# Patient Record
Sex: FEMALE | Race: WHITE | Employment: UNEMPLOYED | URBAN - METROPOLITAN AREA
[De-identification: names, ages, dates, MRNs, and addresses within clinical notes are randomized per-mention and may not be internally consistent; named-entity substitution may affect disease eponyms.]

---

## 2017-01-04 ENCOUNTER — TELEPHONE (OUTPATIENT)
Dept: OBGYN CLINIC | Facility: CLINIC | Age: 42
End: 2017-01-04

## 2017-01-05 NOTE — TELEPHONE ENCOUNTER
Patient informed and verbalized understanding of normal result    Faxed to pt's fax # as requested [phone]

## 2017-01-25 ENCOUNTER — HOSPITAL ENCOUNTER (OUTPATIENT)
Dept: MAMMOGRAPHY | Facility: HOSPITAL | Age: 42
Discharge: HOME | End: 2017-01-25
Attending: OBSTETRICS & GYNECOLOGY
Payer: COMMERCIAL

## 2017-01-25 PROCEDURE — 77067 SCR MAMMO BI INCL CAD: CPT

## 2017-01-27 ENCOUNTER — TELEPHONE (OUTPATIENT)
Dept: OBGYN CLINIC | Facility: CLINIC | Age: 42
End: 2017-01-27

## 2017-01-27 NOTE — TELEPHONE ENCOUNTER
Pt informed of Dr. Amberly Moya notation that her mammogram results are incomplete and that radiology just needs additional views. Pt verbalizes understanding.

## 2017-01-27 NOTE — TELEPHONE ENCOUNTER
Per pt she just got a call form the mammo dep, that they found \"something\" pt is at work now, very concern she has to go Monday again for a mammo and u/s, pt is very concern because her mom was just dx with breast cancer for the 2nd time

## 2017-01-27 NOTE — TELEPHONE ENCOUNTER
The mammogram results are read as Incomplete. Radiologist is not saying anything is wrong. They just need additional views.

## 2017-01-30 ENCOUNTER — HOSPITAL ENCOUNTER (OUTPATIENT)
Dept: MAMMOGRAPHY | Facility: HOSPITAL | Age: 42
Discharge: HOME | End: 2017-01-30
Attending: OBSTETRICS & GYNECOLOGY
Payer: COMMERCIAL

## 2017-01-30 DIAGNOSIS — R92.8 ABNORMAL MAMMOGRAM: ICD-10-CM

## 2017-01-30 PROCEDURE — 77065 DX MAMMO INCL CAD UNI: CPT | Performed by: RADIOLOGY

## 2017-01-31 ENCOUNTER — TELEPHONE (OUTPATIENT)
Dept: OBGYN CLINIC | Facility: CLINIC | Age: 42
End: 2017-01-31

## 2017-02-01 ENCOUNTER — OFFICE VISIT (OUTPATIENT)
Dept: OBGYN CLINIC | Facility: CLINIC | Age: 42
End: 2017-02-01

## 2017-02-01 VITALS — SYSTOLIC BLOOD PRESSURE: 121 MMHG | DIASTOLIC BLOOD PRESSURE: 78 MMHG

## 2017-02-01 DIAGNOSIS — Z12.31 VISIT FOR SCREENING MAMMOGRAM: Primary | ICD-10-CM

## 2017-02-01 PROCEDURE — 99213 OFFICE O/P EST LOW 20 MIN: CPT | Performed by: OBSTETRICS & GYNECOLOGY

## 2017-02-01 NOTE — PROGRESS NOTES
HPI:    Patient ID: Isiah Bustos is a 37year old female. HPI  Patient here for F/U after additional views on Mammogram.  All is read as normal.  Patient's mother recently diagnosed with recurrence of Breast Ca. She also tested + for Brca.   Patient h encounter.        Meds This Visit:  No prescriptions requested or ordered in this encounter    Imaging & Referrals:  None       #[id number]

## 2017-02-08 ENCOUNTER — APPOINTMENT (OUTPATIENT)
Dept: GENETICS | Facility: HOSPITAL | Age: 42
End: 2017-02-08
Attending: GENETIC COUNSELOR, MS
Payer: COMMERCIAL

## 2017-02-22 ENCOUNTER — APPOINTMENT (OUTPATIENT)
Dept: GENETICS | Facility: HOSPITAL | Age: 42
End: 2017-02-22
Attending: GENETIC COUNSELOR, MS
Payer: COMMERCIAL

## 2017-03-02 ENCOUNTER — OFFICE VISIT (OUTPATIENT)
Dept: GENETICS | Facility: HOSPITAL | Age: 42
End: 2017-03-02
Attending: GENETIC COUNSELOR, MS
Payer: COMMERCIAL

## 2017-03-02 DIAGNOSIS — Z80.3 FAMILY HISTORY OF MALIGNANT NEOPLASM OF BREAST: Primary | ICD-10-CM

## 2017-03-02 DIAGNOSIS — Z84.81 FHX: BRCA1 GENE POSITIVE: ICD-10-CM

## 2017-03-02 DIAGNOSIS — Z80.41 FAMILY HISTORY OF MALIGNANT NEOPLASM OF OVARY: ICD-10-CM

## 2017-03-02 PROCEDURE — 96040 MEDICAL GENETICS COUNSELING EA 30 MIN: CPT | Performed by: GENETIC COUNSELOR, MS

## 2017-03-02 NOTE — PROGRESS NOTES
Reason for visit: Ms. Jolaine Claude is a 46-year-old woman whose mother was found to be positive for a BRCA1 mutation. Other family members are considering the option of genetic testing for this familial mutation.  She is now interested in pursuing genetic testing replacement therapy but does have a history of two years of oral contraceptive usage. She denies any alcohol or tobacco usage. She has not yet had a colonoscopy. She has no history of abnormal mammograms and is up to date on her breast imaging.   She maurizio discussed options including preimplantation genetic diagnosis (PGD), which she does not believe she would be something she would be interested in. The pros and cons of cancer predisposing gene mutation testing were reviewed with Ms. Moi Lyle.   Genetic test familial mutation would yield either a positive (mutation detected) or negative (no mutation detected) result and gives less expensive, faster results.  Given her family history, testing aside from the known familial mutation would not be medically indicate

## 2017-03-09 ENCOUNTER — TELEPHONE (OUTPATIENT)
Dept: GENETICS | Facility: HOSPITAL | Age: 42
End: 2017-03-09

## 2017-03-09 NOTE — TELEPHONE ENCOUNTER
Reported POSITIVE BRCA1 mutation results to Alexus Blakely over phone. She had been in results session with her mother, so she declined meeting in person for a f/u visit.  She was tested by [facility] for the known familial mutation only (BRCA1, [id number]

## 2017-03-09 NOTE — TELEPHONE ENCOUNTER
Spoke with patient about + BRCA1. Patient feeling better about results and is over the shock. Encouraged to call for appointment with Dr Zander Jenkins. Expresses understanding and agrees.

## 2017-03-22 ENCOUNTER — TELEPHONE (OUTPATIENT)
Dept: OBGYN CLINIC | Facility: CLINIC | Age: 42
End: 2017-03-22

## 2017-03-22 NOTE — TELEPHONE ENCOUNTER
Pt voices LMP 1/31/17. Pt did voices she had light, dark brown spotting on march 13th, 17th, and 19th. She also voices she has been feeling dizzy, nauseated and is having sensitivity with smells the past couple of weeks.  Pt has not taken a pregnancy test.

## 2017-03-24 ENCOUNTER — OFFICE VISIT (OUTPATIENT)
Dept: SURGERY | Facility: CLINIC | Age: 42
End: 2017-03-24

## 2017-03-24 VITALS
DIASTOLIC BLOOD PRESSURE: 76 MMHG | SYSTOLIC BLOOD PRESSURE: 114 MMHG | HEIGHT: 61 IN | HEART RATE: 62 BPM | WEIGHT: 123.38 LBS | RESPIRATION RATE: 18 BRPM | TEMPERATURE: 98 F | BODY MASS INDEX: 23.29 KG/M2

## 2017-03-24 DIAGNOSIS — Z15.01 BRCA1 GENETIC CARRIER: Primary | ICD-10-CM

## 2017-03-24 DIAGNOSIS — Z15.09 BRCA1 GENETIC CARRIER: Primary | ICD-10-CM

## 2017-03-24 DIAGNOSIS — R92.2 DENSE BREASTS: ICD-10-CM

## 2017-03-24 DIAGNOSIS — Z12.39 BREAST CANCER SCREENING, HIGH RISK PATIENT: ICD-10-CM

## 2017-03-24 PROCEDURE — 99244 OFF/OP CNSLTJ NEW/EST MOD 40: CPT | Performed by: SURGERY

## 2017-03-24 NOTE — PROGRESS NOTES
Patient presents with:  High Risk For Breast Cancer: Referred by Dr. Shannon Bacon, BRCA 1 +    Verified and updated allergy and medication list.     LMP: 01/31/17    Age of menarche: 9  Number of pregnancies:3  Age at 4st full term delivery: 40    Education Re

## 2017-04-03 ENCOUNTER — OFFICE VISIT (OUTPATIENT)
Dept: OBGYN CLINIC | Facility: CLINIC | Age: 42
End: 2017-04-03

## 2017-04-03 VITALS
SYSTOLIC BLOOD PRESSURE: 115 MMHG | BODY MASS INDEX: 23 KG/M2 | DIASTOLIC BLOOD PRESSURE: 74 MMHG | WEIGHT: 124 LBS | HEART RATE: 55 BPM

## 2017-04-03 DIAGNOSIS — N92.6 MISSED MENSES: Primary | ICD-10-CM

## 2017-04-03 PROBLEM — O09.299 H/O GESTATIONAL DIABETES IN PRIOR PREGNANCY, CURRENTLY PREGNANT: Status: ACTIVE | Noted: 2017-04-03

## 2017-04-03 PROBLEM — O09.529 AMA (ADVANCED MATERNAL AGE) MULTIGRAVIDA 35+: Status: ACTIVE | Noted: 2017-04-03

## 2017-04-03 PROBLEM — Z86.32 H/O GESTATIONAL DIABETES IN PRIOR PREGNANCY, CURRENTLY PREGNANT: Status: ACTIVE | Noted: 2017-04-03

## 2017-04-03 PROBLEM — O09.299 H/O PRE-ECLAMPSIA IN PRIOR PREGNANCY, CURRENTLY PREGNANT: Status: ACTIVE | Noted: 2017-04-03

## 2017-04-03 PROBLEM — Z98.891 H/O CESAREAN SECTION: Status: ACTIVE | Noted: 2017-04-03

## 2017-04-03 PROBLEM — O09.529 AMA (ADVANCED MATERNAL AGE) MULTIGRAVIDA 35+ (HCC): Status: ACTIVE | Noted: 2017-04-03

## 2017-04-03 PROBLEM — O09.299 H/O GESTATIONAL DIABETES IN PRIOR PREGNANCY, CURRENTLY PREGNANT (HCC): Status: ACTIVE | Noted: 2017-04-03

## 2017-04-03 PROBLEM — Z98.890 H/O LEEP: Status: ACTIVE | Noted: 2017-04-03

## 2017-04-03 PROBLEM — O09.299 H/O PRE-ECLAMPSIA IN PRIOR PREGNANCY, CURRENTLY PREGNANT (HCC): Status: ACTIVE | Noted: 2017-04-03

## 2017-04-03 PROBLEM — Z86.32 H/O GESTATIONAL DIABETES IN PRIOR PREGNANCY, CURRENTLY PREGNANT (HCC): Status: ACTIVE | Noted: 2017-04-03

## 2017-04-03 PROCEDURE — 99213 OFFICE O/P EST LOW 20 MIN: CPT | Performed by: OBSTETRICS & GYNECOLOGY

## 2017-04-03 PROCEDURE — 81025 URINE PREGNANCY TEST: CPT | Performed by: OBSTETRICS & GYNECOLOGY

## 2017-04-03 NOTE — PROGRESS NOTES
HPI:    Patient ID: Ly Wang is a 40year old female. HPI  Patient here for PCV. Discussed PNC and PNV. Already taking PNV. Discussed optional and required screening. Diet and exercise reviewed. S/P Gastric Bypass surgery. Has lost 90 lbs.

## 2017-04-10 ENCOUNTER — NURSE ONLY (OUTPATIENT)
Dept: OBGYN CLINIC | Facility: CLINIC | Age: 42
End: 2017-04-10

## 2017-04-10 DIAGNOSIS — Z34.81 OTHER NORMAL PREGNANCY, NOT FIRST, FIRST TRIMESTER: Primary | ICD-10-CM

## 2017-04-10 NOTE — PROGRESS NOTES
Pt here today for [facility]   Education visit, Educational material reviewed. Pt verbalized understanding. Rx given for pn labs. Consents to blood transfusion.

## 2017-05-10 ENCOUNTER — INITIAL PRENATAL (OUTPATIENT)
Dept: OBGYN CLINIC | Facility: CLINIC | Age: 42
End: 2017-05-10

## 2017-05-10 VITALS — WEIGHT: 131 LBS | BODY MASS INDEX: 25 KG/M2 | SYSTOLIC BLOOD PRESSURE: 104 MMHG | DIASTOLIC BLOOD PRESSURE: 60 MMHG

## 2017-05-10 DIAGNOSIS — Z34.91 NORMAL PREGNANCY, FIRST TRIMESTER: Primary | ICD-10-CM

## 2017-05-10 PROBLEM — Z98.84 H/O GASTRIC BYPASS: Status: ACTIVE | Noted: 2017-05-10

## 2017-05-10 PROCEDURE — 81002 URINALYSIS NONAUTO W/O SCOPE: CPT | Performed by: OBSTETRICS & GYNECOLOGY

## 2017-05-10 NOTE — PROGRESS NOTES
GC/Chlamydia Culture Done. Patient to see MFM for AMA and H/O LEEP. Unsure about Genetic testing will discuss with MFM. Will need Level 2 U/S. Will do labs this weekend. Desires Repeat  @ 39 weeks.

## 2017-05-11 ENCOUNTER — OFFICE VISIT (OUTPATIENT)
Dept: INTERNAL MEDICINE CLINIC | Facility: CLINIC | Age: 42
End: 2017-05-11

## 2017-05-11 ENCOUNTER — TELEPHONE (OUTPATIENT)
Dept: INTERNAL MEDICINE CLINIC | Facility: CLINIC | Age: 42
End: 2017-05-11

## 2017-05-11 VITALS
DIASTOLIC BLOOD PRESSURE: 80 MMHG | BODY MASS INDEX: 24.99 KG/M2 | WEIGHT: 132.38 LBS | HEIGHT: 61 IN | TEMPERATURE: 98 F | OXYGEN SATURATION: 98 % | SYSTOLIC BLOOD PRESSURE: 110 MMHG | HEART RATE: 61 BPM

## 2017-05-11 DIAGNOSIS — L70.0 ACNE VULGARIS: ICD-10-CM

## 2017-05-11 DIAGNOSIS — Z98.84 H/O GASTRIC BYPASS: ICD-10-CM

## 2017-05-11 DIAGNOSIS — Z00.00 PHYSICAL EXAM: Primary | ICD-10-CM

## 2017-05-11 DIAGNOSIS — O09.291 H/O PRE-ECLAMPSIA IN PRIOR PREGNANCY, CURRENTLY PREGNANT, FIRST TRIMESTER: ICD-10-CM

## 2017-05-11 DIAGNOSIS — Z98.891 H/O CESAREAN SECTION: ICD-10-CM

## 2017-05-11 DIAGNOSIS — L63.9 ALOPECIA AREATA: ICD-10-CM

## 2017-05-11 DIAGNOSIS — Z80.3 FAMILY HISTORY OF MALIGNANT NEOPLASM OF BREAST: ICD-10-CM

## 2017-05-11 DIAGNOSIS — Z80.41 FAMILY HISTORY OF MALIGNANT NEOPLASM OF OVARY: ICD-10-CM

## 2017-05-11 DIAGNOSIS — Z84.81 FHX: BRCA1 GENE POSITIVE: ICD-10-CM

## 2017-05-11 DIAGNOSIS — Z86.32 H/O GESTATIONAL DIABETES IN PRIOR PREGNANCY, CURRENTLY PREGNANT, FIRST TRIMESTER: ICD-10-CM

## 2017-05-11 DIAGNOSIS — O09.522 AMA (ADVANCED MATERNAL AGE) MULTIGRAVIDA 35+, SECOND TRIMESTER: ICD-10-CM

## 2017-05-11 DIAGNOSIS — Z98.890 H/O LEEP: ICD-10-CM

## 2017-05-11 DIAGNOSIS — O09.291 H/O GESTATIONAL DIABETES IN PRIOR PREGNANCY, CURRENTLY PREGNANT, FIRST TRIMESTER: ICD-10-CM

## 2017-05-11 PROBLEM — O09.529 AMA (ADVANCED MATERNAL AGE) MULTIGRAVIDA 35+: Status: RESOLVED | Noted: 2017-04-03 | Resolved: 2017-05-11

## 2017-05-11 PROBLEM — O09.529 AMA (ADVANCED MATERNAL AGE) MULTIGRAVIDA 35+ (HCC): Status: RESOLVED | Noted: 2017-04-03 | Resolved: 2017-05-11

## 2017-05-11 PROCEDURE — 99386 PREV VISIT NEW AGE 40-64: CPT | Performed by: INTERNAL MEDICINE

## 2017-05-11 NOTE — TELEPHONE ENCOUNTER
Issa Hyde, this patient sees me in the office after Dr. Holli Choudhury has retired, has about 4 patches of clearing on her scalp that are very suspicious for alopecia areata, are you one and that will procedure these to improve them? she has an appointment coming u

## 2017-05-11 NOTE — PATIENT INSTRUCTIONS
1. Physical exam  Physical exam instruction: Improve diet and exercise, call with questions. 2. AMA (advanced maternal age) multigravida 33+, second trimester  Get thru it and call with questions    3. Acne vulgaris  Cont with derm    4.  Family history of

## 2017-05-11 NOTE — TELEPHONE ENCOUNTER
Dr. Zulema Maxwell, yes we treat patients with Alopecia Areata. Of course, everything is more challenging with pregnancy. Her appointment is 5/17, There might be cancellations before that, the staff can offer her these.  Thanks, Alejandro Vasquez

## 2017-05-11 NOTE — PROGRESS NOTES
HPI:   Paul Grider is a 40year old female who presents for a complete physical exam.     Patient complains of: Patient presents with:  Establish Care: Prior PCP Dr. Stephanie Willett, sees Dr. Wil Lee, sees Dr. Raz Willis for +BRCA gene.  Pt will be having Comment boil surgery      2012    GASTRIC BYPASS,OBESE<100CM KRISH-EN-Y      HEMORRHOIDECTOMY        Family History   Problem Relation Age of Onset   • Cancer Other    • Diabetes Other      paternal   • Arthritis Other    • Heart Disease Ot allergies and food allergies. EXAM:   /80 mmHg  Pulse 61  Temp(Src) 98.4 °F (36.9 °C)  Ht 5' 1\" (1.549 m)  Wt 132 lb 6.4 oz (60.056 kg)  BMI 25.03 kg/m2  SpO2 98%  LMP 01/31/2017  Body mass index is 25.03 kg/(m^2).    PHYSICAL EXAMINATION:     Gen f/u with gyne    11. H/O pre-eclampsia in prior pregnancy, currently pregnant, first trimester  Stable, f/u with gyne    15.  Alopecia areata  See dr Veronica Villalobos did sent her a message and will let you know      Judit Mendez, DO  5/11/2017  5:48 PM

## 2017-05-17 ENCOUNTER — OFFICE VISIT (OUTPATIENT)
Dept: DERMATOLOGY CLINIC | Facility: CLINIC | Age: 42
End: 2017-05-17

## 2017-05-17 DIAGNOSIS — L63.9 ALOPECIA AREATA: Primary | ICD-10-CM

## 2017-05-17 PROCEDURE — 99202 OFFICE O/P NEW SF 15 MIN: CPT | Performed by: DERMATOLOGY

## 2017-05-17 PROCEDURE — 99212 OFFICE O/P EST SF 10 MIN: CPT | Performed by: DERMATOLOGY

## 2017-05-17 RX ORDER — CLOBETASOL PROPIONATE 0.46 MG/ML
1 SOLUTION TOPICAL 2 TIMES DAILY
Qty: 50 ML | Refills: 6 | Status: SHIPPED | OUTPATIENT
Start: 2017-05-17 | End: 2017-08-09

## 2017-05-24 ENCOUNTER — HOSPITAL ENCOUNTER (OUTPATIENT)
Dept: PERINATAL CARE | Facility: HOSPITAL | Age: 42
Discharge: HOME | End: 2017-05-24
Attending: OBSTETRICS & GYNECOLOGY
Payer: COMMERCIAL

## 2017-05-24 ENCOUNTER — TELEPHONE (OUTPATIENT)
Dept: OBGYN CLINIC | Facility: CLINIC | Age: 42
End: 2017-05-24

## 2017-05-24 VITALS — DIASTOLIC BLOOD PRESSURE: 79 MMHG | SYSTOLIC BLOOD PRESSURE: 114 MMHG | HEART RATE: 70 BPM

## 2017-05-24 DIAGNOSIS — O09.522 AMA (ADVANCED MATERNAL AGE) MULTIGRAVIDA 35+, SECOND TRIMESTER: ICD-10-CM

## 2017-05-24 DIAGNOSIS — Z98.890 H/O LEEP: ICD-10-CM

## 2017-05-24 DIAGNOSIS — Z98.890 H/O LEEP: Primary | ICD-10-CM

## 2017-05-24 PROCEDURE — 76817 TRANSVAGINAL US OBSTETRIC: CPT | Performed by: OBSTETRICS & GYNECOLOGY

## 2017-05-24 PROCEDURE — 76815 OB US LIMITED FETUS(S): CPT | Performed by: OBSTETRICS & GYNECOLOGY

## 2017-05-24 PROCEDURE — 99243 OFF/OP CNSLTJ NEW/EST LOW 30: CPT | Performed by: OBSTETRICS & GYNECOLOGY

## 2017-05-24 NOTE — PROGRESS NOTES
Ned Silva is a 40year old female. Reason for Consult:   AMA. H/o LEEP. H/o gastric bypass. Previous   Doing well. No complaints.   Review of History:     OB History:    Obstetric History     T1    TAB1  SAB0  E0  M0  L1 Cancer Other    • Diabetes Other      paternal   • Arthritis Other    • Heart Disease Other      family h/o   • High Cholesterol Father    • Breast Cancer Mother 48     also 58; BRCA1 +   • Diabetes Mother    • Hypertension Mother    • Ovarian Cancer Mater were not associated with a significant increase in PPROM frequency. The risk of  delivery following LEEP is controversial because some studies did not find an increased  risk of  mortality and  delivery.   Greater depth of exci the increased number of women aged 28 to 39, but also to later marriage, second marriage, the availability of better contraceptive options, and wider opportunities for further education and career advancement.     Studies have generally shown good pregnancy two most common medical problems complicating pregnancy are hypertension and diabetes.    The incidence of preeclampsia in the general obstetric population is 3 to 4 percent; this increases to 5 to 10 percent in women over age 36 and is as high as 28 percen cervical length is  3.1 cm without funneling. See imaging tab for complete ultrasound report or in PACS  IMPRESSION:   IUP at 16w1d  AMA- she declined all genetic testing.   H/o LEEP  H/o   H/o gastric bypass  H/o preeclampsia    RECOMMENDATIONS:

## 2017-05-24 NOTE — TELEPHONE ENCOUNTER
Clobetasol Propionate 0.05 % External Solution 50 mL 6 5/17/2017 5/17/2018      Sig :  Apply 1 mL topically 2 (two) times daily.       Route:   Topical       Order #:   [id number]               Pt was prescribed rxby derm for alopecia, per derm okay in preg

## 2017-05-26 NOTE — TELEPHONE ENCOUNTER
Clobetasol is a Category C medication. The patient has completed her first trimester so the fetus is formed and just growing at this time. If the patient feels the benefit of the treatment outweighs the risk then she may use it.   I feel that the hormonal

## 2017-05-31 NOTE — PROGRESS NOTES
Beverly Quevedo is a 40year old female. Patient presents with:  Alopecia: LOV 11/28/2011. Pt presenting with hair loss. Currently using nonspecific shampoo once weekly.             Penicillins    Current Outpatient Prescriptions:  Clobetasol Propionate ,      induced , ; pregnancy management - Cervidil / Pitocin         Past Surgical History    OTHER SURGICAL HISTORY      Comment boil surgery          GASTRIC BYPASS,OBESE<100CM KRISH-EN-Y  2015    HEMORRHOIDECT con't :   Patient presents with concerns above. Denies any other systemic complaints. No fevers, chills, night sweats, photosensitivity, lymph node swelling. No heat or cold intolerance. No other skin complaints.   History, medications, allergies as not Prescriptions Disp Refills    Clobetasol Propionate 0.05 % External Solution 50 mL 6      Sig: Apply 1 mL topically 2 (two) times daily.            Alopecia areata  (primary encounter diagnosis)    No orders of the defined types were placed in this encounte

## 2017-06-02 ENCOUNTER — TELEPHONE (OUTPATIENT)
Dept: OBGYN CLINIC | Facility: CLINIC | Age: 42
End: 2017-06-02

## 2017-06-02 NOTE — TELEPHONE ENCOUNTER
JONI. Patient was given her prenatal labs on 4/10/17 and has not had the testing done.  Please inform patient she is to have the labs done

## 2017-06-13 NOTE — PROGRESS NOTES
Breast Surgery New Patient Consultation    This is the first visit for this 40year old woman, referred by Dr. Sesar Denson, who presents for evaluation of confirmed BRCA 1 mutation.     History of Present Illness:   Ms. Nando Bowen is a 40year old woman wh Take by mouth. Disp:  Rfl:    Cyanocobalamin (B-12) 100 MCG Oral Tab Take by mouth. Disp:  Rfl:    Cholecalciferol (VITAMIN D3) 400 UNITS Oral Tab Take by mouth. Disp:  Rfl:    Omega-3-acid Ethyl Esters 1 G Oral Cap Take 1 g by mouth daily.  Disp:  Rfl: hoarseness, change in voice, facial trauma.     Respiratory:  The patient denies chronic cough, phlegm, hemoptysis, pleurisy/chest pain, pneumonia, asthma, wheezing, difficulty in breathing with exertion, emphysema, chronic bronchitis, shortness of breath o loss/gain, fertility or hormone problems, cold intolerance, thyroid disease. Allergic/Immunologic:  There is no history of hives, hay fever, angioedema or anaphylaxis.     /76 mmHg  Pulse 62  Temp(Src) 98.1 °F (36.7 °C) (Tympanic)  Resp 18  Ht 1.5 without deformity, cyanosis or edema. Impression:   Ms. Juan Francisco Hughes is a 40year old woman presents with a high risk for breast cancer secondary to confirmed BRCA mutation.      Discussion and Plan:  I had a discussion with the Patient regarding her b medication (controversial) for ovarian cancer. I have recommended no immediate intervention in light of her current pregnancy. As an option for screening we can do an AWBUS in July 2017 for evaluation.  I have recommended that she return for an exam fol

## 2017-06-15 ENCOUNTER — LAB ENCOUNTER (OUTPATIENT)
Dept: LAB | Age: 42
End: 2017-06-15
Attending: OBSTETRICS & GYNECOLOGY
Payer: COMMERCIAL

## 2017-06-15 DIAGNOSIS — Z34.81 OTHER NORMAL PREGNANCY, NOT FIRST, FIRST TRIMESTER: ICD-10-CM

## 2017-06-15 PROCEDURE — 87389 HIV-1 AG W/HIV-1&-2 AB AG IA: CPT

## 2017-06-15 PROCEDURE — 87340 HEPATITIS B SURFACE AG IA: CPT

## 2017-06-15 PROCEDURE — 85025 COMPLETE CBC W/AUTO DIFF WBC: CPT

## 2017-06-15 PROCEDURE — 86803 HEPATITIS C AB TEST: CPT

## 2017-06-15 PROCEDURE — 80053 COMPREHEN METABOLIC PANEL: CPT

## 2017-06-15 PROCEDURE — 87086 URINE CULTURE/COLONY COUNT: CPT

## 2017-06-15 PROCEDURE — 86900 BLOOD TYPING SEROLOGIC ABO: CPT

## 2017-06-15 PROCEDURE — 36415 COLL VENOUS BLD VENIPUNCTURE: CPT

## 2017-06-15 PROCEDURE — 86901 BLOOD TYPING SEROLOGIC RH(D): CPT

## 2017-06-15 PROCEDURE — 86762 RUBELLA ANTIBODY: CPT

## 2017-06-15 PROCEDURE — 86780 TREPONEMA PALLIDUM: CPT

## 2017-06-15 PROCEDURE — 86850 RBC ANTIBODY SCREEN: CPT

## 2017-06-15 PROCEDURE — 81003 URINALYSIS AUTO W/O SCOPE: CPT

## 2017-06-16 ENCOUNTER — ROUTINE PRENATAL (OUTPATIENT)
Dept: OBGYN CLINIC | Facility: CLINIC | Age: 42
End: 2017-06-16

## 2017-06-16 VITALS
SYSTOLIC BLOOD PRESSURE: 122 MMHG | DIASTOLIC BLOOD PRESSURE: 82 MMHG | HEART RATE: 54 BPM | WEIGHT: 135 LBS | BODY MASS INDEX: 26 KG/M2

## 2017-06-16 DIAGNOSIS — Z34.82 OTHER NORMAL PREGNANCY, NOT FIRST, SECOND TRIMESTER: Primary | ICD-10-CM

## 2017-06-21 ENCOUNTER — HOSPITAL ENCOUNTER (OUTPATIENT)
Dept: PERINATAL CARE | Facility: HOSPITAL | Age: 42
Discharge: HOME | End: 2017-06-21
Attending: OBSTETRICS & GYNECOLOGY
Payer: COMMERCIAL

## 2017-06-21 ENCOUNTER — TELEPHONE (OUTPATIENT)
Dept: OBGYN CLINIC | Facility: CLINIC | Age: 42
End: 2017-06-21

## 2017-06-21 VITALS — DIASTOLIC BLOOD PRESSURE: 62 MMHG | SYSTOLIC BLOOD PRESSURE: 124 MMHG | HEART RATE: 58 BPM

## 2017-06-21 DIAGNOSIS — O09.522 AMA (ADVANCED MATERNAL AGE) MULTIGRAVIDA 35+, SECOND TRIMESTER: ICD-10-CM

## 2017-06-21 DIAGNOSIS — O09.292 H/O GESTATIONAL DIABETES IN PRIOR PREGNANCY, CURRENTLY PREGNANT, SECOND TRIMESTER: ICD-10-CM

## 2017-06-21 DIAGNOSIS — Z98.891 H/O CESAREAN SECTION: ICD-10-CM

## 2017-06-21 DIAGNOSIS — Z98.890 H/O LEEP: ICD-10-CM

## 2017-06-21 DIAGNOSIS — Z86.32 H/O GESTATIONAL DIABETES IN PRIOR PREGNANCY, CURRENTLY PREGNANT, SECOND TRIMESTER: ICD-10-CM

## 2017-06-21 DIAGNOSIS — Z98.84 H/O GASTRIC BYPASS: ICD-10-CM

## 2017-06-21 DIAGNOSIS — O09.292 H/O PRE-ECLAMPSIA IN PRIOR PREGNANCY, CURRENTLY PREGNANT, SECOND TRIMESTER: ICD-10-CM

## 2017-06-21 DIAGNOSIS — O09.522 AMA (ADVANCED MATERNAL AGE) MULTIGRAVIDA 35+, SECOND TRIMESTER: Primary | ICD-10-CM

## 2017-06-21 DIAGNOSIS — Z34.82 OTHER NORMAL PREGNANCY, NOT FIRST, SECOND TRIMESTER: Primary | ICD-10-CM

## 2017-06-21 PROCEDURE — 76817 TRANSVAGINAL US OBSTETRIC: CPT | Performed by: OBSTETRICS & GYNECOLOGY

## 2017-06-21 PROCEDURE — 99213 OFFICE O/P EST LOW 20 MIN: CPT | Performed by: OBSTETRICS & GYNECOLOGY

## 2017-06-21 PROCEDURE — 76811 OB US DETAILED SNGL FETUS: CPT | Performed by: OBSTETRICS & GYNECOLOGY

## 2017-06-21 NOTE — TELEPHONE ENCOUNTER
Meenakshi Stringer RN from [facility] states patient needs 1hr gtt due to gastric bypass and h/o diabetes. Patient informed to fast 2hrs prior to test. Order sent to [facility].

## 2017-06-21 NOTE — PROGRESS NOTES
Pt for level 2 U/S  Hx AMA and leep  Hx preeclampsia and GDM with last pregnancy  Pt Brca1 gene pos pt to have surgery next year mastectomy and hyst  Denies pregnancy complaints  States active fetus

## 2017-06-21 NOTE — PROGRESS NOTES
Juan Francisco Hughes is a 40year old female. Reason for Consult:   AMA. H/o LEEP. H/o gastric bypass. Previous   Doing well. No complaints. She stated her periods are very regular and she was sure of her LMP.   Review of History:     OB Histor bowel.  Echogenic intracardiac foci absent. Nasal bone present. Choroid plexus cyst absent. Summary of Ultrasound findings: This is a [place] pregnancy   The fetal measurements are consistent with established EDC within 2 wks.  No gross ultrasound e

## 2017-06-23 ENCOUNTER — APPOINTMENT (OUTPATIENT)
Dept: LAB | Age: 42
End: 2017-06-23
Attending: OBSTETRICS & GYNECOLOGY
Payer: COMMERCIAL

## 2017-06-23 DIAGNOSIS — Z34.81 OTHER NORMAL PREGNANCY, NOT FIRST, FIRST TRIMESTER: ICD-10-CM

## 2017-06-23 PROCEDURE — 84156 ASSAY OF PROTEIN URINE: CPT

## 2017-06-23 PROCEDURE — 82570 ASSAY OF URINE CREATININE: CPT

## 2017-07-13 ENCOUNTER — ROUTINE PRENATAL (OUTPATIENT)
Dept: OBGYN CLINIC | Facility: CLINIC | Age: 42
End: 2017-07-13

## 2017-07-13 VITALS — WEIGHT: 138 LBS | SYSTOLIC BLOOD PRESSURE: 110 MMHG | DIASTOLIC BLOOD PRESSURE: 72 MMHG | BODY MASS INDEX: 26 KG/M2

## 2017-07-13 DIAGNOSIS — O09.529 SUPERVISION OF HIGH-RISK PREGNANCY OF ELDERLY MULTIGRAVIDA: Primary | ICD-10-CM

## 2017-07-13 PROCEDURE — 81002 URINALYSIS NONAUTO W/O SCOPE: CPT | Performed by: OBSTETRICS & GYNECOLOGY

## 2017-07-13 NOTE — PROGRESS NOTES
No c/o. Good fm. Order early DM screen for hx of GDM A1. Desires to breast feed. Normal Level 2 u/s  Had normal CL ~3 cm.

## 2017-07-21 ENCOUNTER — LAB ENCOUNTER (OUTPATIENT)
Dept: LAB | Facility: HOSPITAL | Age: 42
End: 2017-07-21
Attending: OBSTETRICS & GYNECOLOGY
Payer: COMMERCIAL

## 2017-07-21 DIAGNOSIS — O09.00 PREGNANCY WITH HISTORY OF INFERTILITY: Primary | ICD-10-CM

## 2017-07-21 DIAGNOSIS — Z34.82 OTHER NORMAL PREGNANCY, NOT FIRST, SECOND TRIMESTER: ICD-10-CM

## 2017-07-21 LAB — GLUCOSE 1H P 50 G GLC PO SERPL-MCNC: 186 MG/DL

## 2017-07-21 PROCEDURE — 36415 COLL VENOUS BLD VENIPUNCTURE: CPT

## 2017-07-21 PROCEDURE — 82950 GLUCOSE TEST: CPT

## 2017-07-25 ENCOUNTER — HOSPITAL ENCOUNTER (OUTPATIENT)
Dept: ULTRASOUND IMAGING | Facility: HOSPITAL | Age: 42
Discharge: HOME | End: 2017-07-25
Attending: SURGERY
Payer: COMMERCIAL

## 2017-07-25 ENCOUNTER — TELEPHONE (OUTPATIENT)
Dept: OBGYN CLINIC | Facility: CLINIC | Age: 42
End: 2017-07-25

## 2017-07-25 DIAGNOSIS — R92.2 DENSE BREASTS: ICD-10-CM

## 2017-07-25 DIAGNOSIS — Z15.09 BRCA1 GENETIC CARRIER: ICD-10-CM

## 2017-07-25 DIAGNOSIS — Z12.39 BREAST CANCER SCREENING, HIGH RISK PATIENT: ICD-10-CM

## 2017-07-25 DIAGNOSIS — Z15.01 BRCA1 GENETIC CARRIER: ICD-10-CM

## 2017-07-25 PROCEDURE — 76641 ULTRASOUND BREAST COMPLETE: CPT | Performed by: SURGERY

## 2017-07-25 NOTE — TELEPHONE ENCOUNTER
Calling for glucose test results ordered by Dr Richard Valera, [facility].   [phone] call even if results not in .

## 2017-07-25 NOTE — TELEPHONE ENCOUNTER
Pt informed that her 1 hour glucola test was elevated and she will need a 3 hour glucola ASAP. Pt given lab phone number and instructed to call and set up appointment. Pt verbalizes understanding.  FYI-pt wants to have the carbohydrate meal before her test

## 2017-07-25 NOTE — TELEPHONE ENCOUNTER
Please notify patient that her 50 g Glucola is elevated and she will need a 3 hour GTT ASAP. It looks as if Dr. Bi Pereyra has ordered this so it should be in his inbox as well. I have copied him on this so he is aware that she is getting a 3 hour GTT.

## 2017-07-26 ENCOUNTER — TELEPHONE (OUTPATIENT)
Dept: OBGYN CLINIC | Facility: CLINIC | Age: 42
End: 2017-07-26

## 2017-07-26 DIAGNOSIS — R73.09 ELEVATED GLUCOSE TOLERANCE TEST: Primary | ICD-10-CM

## 2017-07-26 NOTE — TELEPHONE ENCOUNTER
Order placed for 3 hr gtt. Patient informed, will schedule. Per pt she will be doing the meal plan instead of drinking glucola. Verified with lab that she can do the meal plan as alternative option.

## 2017-08-09 ENCOUNTER — TELEPHONE (OUTPATIENT)
Dept: OBGYN CLINIC | Facility: CLINIC | Age: 42
End: 2017-08-09

## 2017-08-09 ENCOUNTER — ROUTINE PRENATAL (OUTPATIENT)
Dept: OBGYN CLINIC | Facility: CLINIC | Age: 42
End: 2017-08-09

## 2017-08-09 VITALS — WEIGHT: 141 LBS | SYSTOLIC BLOOD PRESSURE: 98 MMHG | BODY MASS INDEX: 27 KG/M2 | DIASTOLIC BLOOD PRESSURE: 60 MMHG

## 2017-08-09 DIAGNOSIS — Z23 NEED FOR VACCINATION: ICD-10-CM

## 2017-08-09 DIAGNOSIS — O99.810 ABNORMAL GLUCOSE AFFECTING PREGNANCY: Primary | ICD-10-CM

## 2017-08-09 PROBLEM — Z91.89 AT RISK FOR POSTPARTUM DEPRESSION: Status: ACTIVE | Noted: 2017-08-09

## 2017-08-09 LAB
APPEARANCE: CLEAR
MULTISTIX LOT#: NORMAL NUMERIC
MULTISTIX LOT#: NORMAL NUMERIC
PH, URINE: 7 (ref 4.5–8)
SPECIFIC GRAVITY: 1.02 (ref 1–1.03)
URINE-COLOR: YELLOW
UROBILINOGEN,SEMI-QN: 0 MG/DL (ref 0–1.9)

## 2017-08-09 PROCEDURE — 90715 TDAP VACCINE 7 YRS/> IM: CPT | Performed by: ADVANCED PRACTICE MIDWIFE

## 2017-08-09 PROCEDURE — 90471 IMMUNIZATION ADMIN: CPT | Performed by: ADVANCED PRACTICE MIDWIFE

## 2017-08-09 RX ORDER — BREAST PUMP
EACH MISCELLANEOUS
Qty: 1 EACH | Refills: 0 | Status: SHIPPED | OUTPATIENT
Start: 2017-08-09 | End: 2017-11-24

## 2017-08-09 NOTE — TELEPHONE ENCOUNTER
Please advise this patient that a 75 gram diet can be used and a 2 hour GTT done per Dr. Gisela Michaud  The :  2 rectangles of zac crackers (4 squares)  1 - 9\"  Long banana  6 oz grape juice (welchs)    Be exact with measurements    I have put the order in

## 2017-08-09 NOTE — PROGRESS NOTES
S.  Denies LEAL, vision change, URQ pain, sweling. Baby is active its boy. States she had PP depression with last pregnancy that she only told her father about  She had suicidal ideation but no action.   Wants to alert us so she can be proactive this pregnan

## 2017-08-15 ENCOUNTER — TELEPHONE (OUTPATIENT)
Dept: PEDIATRICS CLINIC | Facility: CLINIC | Age: 42
End: 2017-08-15

## 2017-08-15 NOTE — TELEPHONE ENCOUNTER
Pt is 28 weeks prg and wants to know if she can take Asprin 81 mg.   And also wants to know if she has to take the 3 hr glucose test

## 2017-08-16 NOTE — TELEPHONE ENCOUNTER
Pt voices chasidy told her since she was pre-eclamptic with her last baby, it joes be a good idea to take a baby aspirin every day. Pt wants to verify this with mlm first. She also voices she talked to chasidy regarding having her 3 glucose test done.  Pt voices si

## 2017-08-16 NOTE — TELEPHONE ENCOUNTER
The range of \"Baby Aspirin\" ranges from 81mg to 120mgs. I recommend that the patient see M and get their recommendation for the Baby ASA.   Because of her Gastric Bypass surgery Owen Olguin found a diet load for a 2 hr test that patients with this surgery

## 2017-08-16 NOTE — TELEPHONE ENCOUNTER
Please advise this patient that a 75 gram diet can be used and a 2 hour GTT done per . Baby [place]  The :  2 rectangles of zac crackers (4 squares)  1 - 7-8\" Long banana  6 oz grape juice (welchs)     Be exact with measurements

## 2017-08-21 ENCOUNTER — TELEPHONE (OUTPATIENT)
Dept: PERINATAL CARE | Facility: HOSPITAL | Age: 42
End: 2017-08-21

## 2017-08-21 NOTE — TELEPHONE ENCOUNTER
Pt called and requesting to verify dosage of ASA to be taken for Hx of Preeclampsia. Dr Yeimy Gibson informed and dosage of 81 mg daily of ASA to be taken. Pt verbalized understanding.

## 2017-08-23 ENCOUNTER — ROUTINE PRENATAL (OUTPATIENT)
Dept: OBGYN CLINIC | Facility: CLINIC | Age: 42
End: 2017-08-23

## 2017-08-23 VITALS
BODY MASS INDEX: 27 KG/M2 | SYSTOLIC BLOOD PRESSURE: 112 MMHG | WEIGHT: 143 LBS | HEART RATE: 60 BPM | DIASTOLIC BLOOD PRESSURE: 75 MMHG

## 2017-08-23 DIAGNOSIS — O09.529 SUPERVISION OF ELDERLY MULTIGRAVIDA, UNSPECIFIED TRIMESTER: Primary | ICD-10-CM

## 2017-08-23 DIAGNOSIS — Z34.83 ENCOUNTER FOR SUPERVISION OF OTHER NORMAL PREGNANCY IN THIRD TRIMESTER: ICD-10-CM

## 2017-08-23 DIAGNOSIS — O34.219 PREVIOUS CESAREAN DELIVERY, ANTEPARTUM CONDITION OR COMPLICATION: ICD-10-CM

## 2017-08-23 LAB
APPEARANCE: CLEAR
MULTISTIX LOT#: NORMAL NUMERIC
PH, URINE: 6 (ref 4.5–8)
SPECIFIC GRAVITY: 1.02 (ref 1–1.03)
URINE-COLOR: YELLOW
UROBILINOGEN,SEMI-QN: 1 MG/DL (ref 0–1.9)

## 2017-08-23 RX ORDER — PRENATAL VIT/IRON FUM/FOLIC AC 27MG-0.8MG
1 TABLET ORAL DAILY
COMMUNITY

## 2017-08-23 NOTE — PROGRESS NOTES
[facility], [facility]  Obstetrics and Gynecology  Prenatal Visit  Donal Barba MD    RADHA Daly is a 40year old.o.  29w1d weeks. Here for routine prenatal visit and is without complaints.   Patient denies any regular uterine contractions, sp SCOPE    Plan   Plan for 2 hour carbohydrate load meal glucose tolerance test as soon as possible due to elevated 50 g Glucola. Patient to follow-up in 2 weeks for routine prenatal visit.   Patient is for bilateral oophorectomy and I discussed with her the

## 2017-09-08 ENCOUNTER — ROUTINE PRENATAL (OUTPATIENT)
Dept: OBGYN CLINIC | Facility: CLINIC | Age: 42
End: 2017-09-08

## 2017-09-08 ENCOUNTER — TELEPHONE (OUTPATIENT)
Dept: OBGYN CLINIC | Facility: CLINIC | Age: 42
End: 2017-09-08

## 2017-09-08 VITALS — WEIGHT: 146.19 LBS | BODY MASS INDEX: 28 KG/M2 | SYSTOLIC BLOOD PRESSURE: 112 MMHG | DIASTOLIC BLOOD PRESSURE: 70 MMHG

## 2017-09-08 DIAGNOSIS — Z34.83 ENCOUNTER FOR SUPERVISION OF OTHER NORMAL PREGNANCY IN THIRD TRIMESTER: Primary | ICD-10-CM

## 2017-09-08 LAB
APPEARANCE: CLEAR
MULTISTIX LOT#: NORMAL NUMERIC
PH, URINE: 6 (ref 4.5–8)
SPECIFIC GRAVITY: 1 (ref 1–1.03)
URINE-COLOR: CLEAR
UROBILINOGEN,SEMI-QN: 0.2 MG/DL (ref 0–1.9)

## 2017-09-13 ENCOUNTER — HOSPITAL ENCOUNTER (OUTPATIENT)
Facility: HOSPITAL | Age: 42
Discharge: HOME | End: 2017-09-13
Attending: OBSTETRICS & GYNECOLOGY | Admitting: OBSTETRICS & GYNECOLOGY
Payer: COMMERCIAL

## 2017-09-13 VITALS — HEART RATE: 68 BPM | SYSTOLIC BLOOD PRESSURE: 118 MMHG | DIASTOLIC BLOOD PRESSURE: 66 MMHG

## 2017-09-13 NOTE — NST
Nonstress Test   Patient: Purvi Lott    Gestation: 32w1d    NST:       Variability: Moderate           Accelerations: Yes           Decelerations: None            Baseline: 125 BPM           Uterine Irritability: Yes                                   A

## 2017-09-15 ENCOUNTER — OFFICE VISIT (OUTPATIENT)
Dept: INTERNAL MEDICINE CLINIC | Facility: CLINIC | Age: 42
End: 2017-09-15

## 2017-09-15 VITALS
SYSTOLIC BLOOD PRESSURE: 110 MMHG | DIASTOLIC BLOOD PRESSURE: 64 MMHG | TEMPERATURE: 99 F | WEIGHT: 146 LBS | BODY MASS INDEX: 28 KG/M2 | HEART RATE: 60 BPM

## 2017-09-15 DIAGNOSIS — M25.561 ACUTE PAIN OF RIGHT KNEE: ICD-10-CM

## 2017-09-15 DIAGNOSIS — M22.8X1 MALTRACKING OF RIGHT PATELLA: Primary | ICD-10-CM

## 2017-09-15 PROCEDURE — 99214 OFFICE O/P EST MOD 30 MIN: CPT | Performed by: INTERNAL MEDICINE

## 2017-09-15 PROCEDURE — 99212 OFFICE O/P EST SF 10 MIN: CPT | Performed by: INTERNAL MEDICINE

## 2017-09-15 NOTE — PROGRESS NOTES
HPI:   Lisseth Chanel is a 40year old female who presents for complains of: Patient presents with:  Knee Pain: right, 1 mo ago, no injury starting with swelling and pain on outside and top of the knee, has gained weight 22 lbs in 4mo with pregnancy.   has above        Makayla Akins 171, DO  9/15/2017  1:14 PM

## 2017-09-15 NOTE — PATIENT INSTRUCTIONS
1. Maltracking of right patella  Start the extension exercises as discussed, reps of 30, last 45° of extension, mild weight, 3 sets 1-2 times per day for the first few weeks  Remember to use ice the knees are bothering leaving on for 20 minutes and off the

## 2017-09-19 ENCOUNTER — LAB ENCOUNTER (OUTPATIENT)
Dept: LAB | Facility: HOSPITAL | Age: 42
End: 2017-09-19
Attending: OBSTETRICS & GYNECOLOGY
Payer: COMMERCIAL

## 2017-09-19 ENCOUNTER — ROUTINE PRENATAL (OUTPATIENT)
Dept: OBGYN CLINIC | Facility: CLINIC | Age: 42
End: 2017-09-19

## 2017-09-19 VITALS
HEART RATE: 65 BPM | DIASTOLIC BLOOD PRESSURE: 75 MMHG | WEIGHT: 146 LBS | BODY MASS INDEX: 28 KG/M2 | SYSTOLIC BLOOD PRESSURE: 130 MMHG

## 2017-09-19 DIAGNOSIS — Z34.83 ENCOUNTER FOR SUPERVISION OF OTHER NORMAL PREGNANCY IN THIRD TRIMESTER: Primary | ICD-10-CM

## 2017-09-19 DIAGNOSIS — O99.810 ABNORMAL GLUCOSE AFFECTING PREGNANCY: ICD-10-CM

## 2017-09-19 PROBLEM — O09.523 ELDERLY MULTIGRAVIDA IN THIRD TRIMESTER: Status: ACTIVE | Noted: 2017-04-03

## 2017-09-19 PROBLEM — O09.523 ELDERLY MULTIGRAVIDA IN THIRD TRIMESTER (HCC): Status: ACTIVE | Noted: 2017-04-03

## 2017-09-19 LAB
GLUCOSE 1H P GLC SERPL-MCNC: 111 MG/DL
GLUCOSE 2H P GLC SERPL-MCNC: 69 MG/DL
GLUCOSE P FAST SERPL-MCNC: 74 MG/DL (ref 70–99)
MULTISTIX LOT#: ABNORMAL NUMERIC
PH, URINE: 6.5 (ref 4.5–8)
SPECIFIC GRAVITY: 1.01 (ref 1–1.03)
UROBILINOGEN,SEMI-QN: 2 MG/DL (ref 0–1.9)

## 2017-09-19 PROCEDURE — 82951 GLUCOSE TOLERANCE TEST (GTT): CPT

## 2017-09-19 PROCEDURE — 36415 COLL VENOUS BLD VENIPUNCTURE: CPT

## 2017-09-20 ENCOUNTER — APPOINTMENT (OUTPATIENT)
Dept: OBGYN CLINIC | Facility: HOSPITAL | Age: 42
End: 2017-09-20
Payer: COMMERCIAL

## 2017-09-20 ENCOUNTER — HOSPITAL ENCOUNTER (OUTPATIENT)
Facility: HOSPITAL | Age: 42
Discharge: HOME | End: 2017-09-20
Attending: OBSTETRICS & GYNECOLOGY | Admitting: OBSTETRICS & GYNECOLOGY
Payer: COMMERCIAL

## 2017-09-20 PROCEDURE — 59025 FETAL NON-STRESS TEST: CPT | Performed by: OBSTETRICS & GYNECOLOGY

## 2017-09-20 NOTE — NST
Nonstress Test   Patient: King Reynaldo    Gestation: 33w1d    NST:ama       Variability: Moderate           Accelerations: Yes           Decelerations: None            Baseline: 140 BPM           Uterine Irritability: No           Contractions: Not prese

## 2017-09-22 ENCOUNTER — HOSPITAL ENCOUNTER (OUTPATIENT)
Dept: PERINATAL CARE | Facility: HOSPITAL | Age: 42
Discharge: HOME | End: 2017-09-22
Attending: OBSTETRICS & GYNECOLOGY
Payer: COMMERCIAL

## 2017-09-22 DIAGNOSIS — Z86.32 H/O GESTATIONAL DIABETES IN PRIOR PREGNANCY, CURRENTLY PREGNANT: ICD-10-CM

## 2017-09-22 DIAGNOSIS — O09.299 H/O PRE-ECLAMPSIA IN PRIOR PREGNANCY, CURRENTLY PREGNANT: ICD-10-CM

## 2017-09-22 DIAGNOSIS — Z98.891 H/O CESAREAN SECTION: ICD-10-CM

## 2017-09-22 DIAGNOSIS — Z98.84 H/O GASTRIC BYPASS: ICD-10-CM

## 2017-09-22 DIAGNOSIS — O09.299 H/O GESTATIONAL DIABETES IN PRIOR PREGNANCY, CURRENTLY PREGNANT: ICD-10-CM

## 2017-09-22 DIAGNOSIS — Z98.890 H/O LEEP: ICD-10-CM

## 2017-09-22 DIAGNOSIS — O09.523 ELDERLY MULTIGRAVIDA IN THIRD TRIMESTER: ICD-10-CM

## 2017-09-22 DIAGNOSIS — O09.523 ELDERLY MULTIGRAVIDA IN THIRD TRIMESTER: Primary | ICD-10-CM

## 2017-09-22 PROCEDURE — 76805 OB US >/= 14 WKS SNGL FETUS: CPT | Performed by: OBSTETRICS & GYNECOLOGY

## 2017-09-22 PROCEDURE — 99213 OFFICE O/P EST LOW 20 MIN: CPT | Performed by: OBSTETRICS & GYNECOLOGY

## 2017-09-22 NOTE — PROGRESS NOTES
Marie Weldon is a 40year old female. Reason for Consult:   AMA. H/o LEEP. H/o gastric bypass. Previous   Doing well. No complaints. She stated her periods are very regular and she was sure of her LMP.   Review of History:     OB Histor gastric bypass  H/o preeclampsia    RECOMMENDATIONS:   1. Weekly NST's at 34 weeks. \\  Twice weekly testing at 38 weeks - weekly NST and weekly BPP. Thank you for allowing me to participate in the care of your patient.   Please do not hesitate to call

## 2017-09-27 ENCOUNTER — HOSPITAL ENCOUNTER (OUTPATIENT)
Facility: HOSPITAL | Age: 42
Discharge: HOME | End: 2017-09-27
Attending: OBSTETRICS & GYNECOLOGY | Admitting: OBSTETRICS & GYNECOLOGY
Payer: COMMERCIAL

## 2017-09-27 ENCOUNTER — HOSPITAL ENCOUNTER (OUTPATIENT)
Dept: OBGYN CLINIC | Facility: HOSPITAL | Age: 42
Discharge: HOME | End: 2017-09-27
Payer: COMMERCIAL

## 2017-09-27 VITALS — DIASTOLIC BLOOD PRESSURE: 81 MMHG | HEART RATE: 70 BPM | SYSTOLIC BLOOD PRESSURE: 122 MMHG

## 2017-09-27 PROCEDURE — 59025 FETAL NON-STRESS TEST: CPT | Performed by: OBSTETRICS & GYNECOLOGY

## 2017-09-27 NOTE — NST
Nonstress Test   Patient: Christina Paniagua    Gestation: 34w1d    NST: SCHEDULED NST FOR AMA       Variability: Moderate           Accelerations: Yes           Decelerations: None            Baseline: 130 BPM           Uterine Irritability: Yes           Con

## 2017-10-04 ENCOUNTER — APPOINTMENT (OUTPATIENT)
Dept: OBGYN CLINIC | Facility: HOSPITAL | Age: 42
End: 2017-10-04
Payer: COMMERCIAL

## 2017-10-04 ENCOUNTER — HOSPITAL ENCOUNTER (OUTPATIENT)
Facility: HOSPITAL | Age: 42
Discharge: HOME | End: 2017-10-04
Attending: OBSTETRICS & GYNECOLOGY | Admitting: OBSTETRICS & GYNECOLOGY
Payer: COMMERCIAL

## 2017-10-04 VITALS — HEART RATE: 56 BPM | SYSTOLIC BLOOD PRESSURE: 127 MMHG | DIASTOLIC BLOOD PRESSURE: 77 MMHG

## 2017-10-04 PROCEDURE — 59025 FETAL NON-STRESS TEST: CPT | Performed by: OBSTETRICS & GYNECOLOGY

## 2017-10-04 NOTE — NST
Nonstress Test   Patient: Marcelo Cox    Gestation: 35w1d    NST:       Variability: Moderate           Accelerations: Yes           Decelerations: None            Baseline: 135 BPM           Uterine Irritability: No           Contractions: Irregular

## 2017-10-06 ENCOUNTER — ROUTINE PRENATAL (OUTPATIENT)
Dept: OBGYN CLINIC | Facility: CLINIC | Age: 42
End: 2017-10-06

## 2017-10-06 VITALS
BODY MASS INDEX: 29 KG/M2 | SYSTOLIC BLOOD PRESSURE: 133 MMHG | HEART RATE: 71 BPM | DIASTOLIC BLOOD PRESSURE: 80 MMHG | WEIGHT: 151.38 LBS

## 2017-10-06 DIAGNOSIS — Z34.83 ENCOUNTER FOR SUPERVISION OF OTHER NORMAL PREGNANCY IN THIRD TRIMESTER: Primary | ICD-10-CM

## 2017-10-06 DIAGNOSIS — Z23 NEED FOR VACCINATION: ICD-10-CM

## 2017-10-06 PROCEDURE — 90686 IIV4 VACC NO PRSV 0.5 ML IM: CPT | Performed by: OBSTETRICS & GYNECOLOGY

## 2017-10-06 PROCEDURE — 90471 IMMUNIZATION ADMIN: CPT | Performed by: OBSTETRICS & GYNECOLOGY

## 2017-10-11 ENCOUNTER — ANESTHESIA (OUTPATIENT)
Dept: OBGYN UNIT | Facility: HOSPITAL | Age: 42
End: 2017-10-11
Payer: COMMERCIAL

## 2017-10-11 ENCOUNTER — SURGERY (OUTPATIENT)
Age: 42
End: 2017-10-11

## 2017-10-11 ENCOUNTER — TELEPHONE (OUTPATIENT)
Dept: OBGYN CLINIC | Facility: CLINIC | Age: 42
End: 2017-10-11

## 2017-10-11 ENCOUNTER — HOSPITAL ENCOUNTER (INPATIENT)
Facility: HOSPITAL | Age: 42
LOS: 3 days | Discharge: HOME | End: 2017-10-14
Attending: OBSTETRICS & GYNECOLOGY | Admitting: OBSTETRICS & GYNECOLOGY
Payer: COMMERCIAL

## 2017-10-11 ENCOUNTER — APPOINTMENT (OUTPATIENT)
Dept: OBGYN CLINIC | Facility: HOSPITAL | Age: 42
End: 2017-10-11
Payer: COMMERCIAL

## 2017-10-11 ENCOUNTER — ANESTHESIA EVENT (OUTPATIENT)
Dept: OBGYN UNIT | Facility: HOSPITAL | Age: 42
End: 2017-10-11
Payer: COMMERCIAL

## 2017-10-11 DIAGNOSIS — O34.219 PREVIOUS CESAREAN DELIVERY AFFECTING PREGNANCY, DELIVERED: Primary | ICD-10-CM

## 2017-10-11 PROBLEM — O47.9 UTERINE CONTRACTIONS DURING PREGNANCY: Status: ACTIVE | Noted: 2017-10-11

## 2017-10-11 PROBLEM — O47.9 UTERINE CONTRACTIONS DURING PREGNANCY (HCC): Status: ACTIVE | Noted: 2017-10-11

## 2017-10-11 PROCEDURE — 59510 CESAREAN DELIVERY: CPT | Performed by: OBSTETRICS & GYNECOLOGY

## 2017-10-11 PROCEDURE — 59514 CESAREAN DELIVERY ONLY: CPT | Performed by: OBSTETRICS & GYNECOLOGY

## 2017-10-11 PROCEDURE — 38206 HARVEST AUTO STEM CELLS: CPT | Performed by: OBSTETRICS & GYNECOLOGY

## 2017-10-11 RX ORDER — HYDROCODONE BITARTRATE AND ACETAMINOPHEN 7.5; 325 MG/1; MG/1
1 TABLET ORAL EVERY 4 HOURS PRN
Status: DISCONTINUED | OUTPATIENT
Start: 2017-10-11 | End: 2017-10-14

## 2017-10-11 RX ORDER — SUCCINYLCHOLINE CHLORIDE 20 MG/ML
INJECTION INTRAMUSCULAR; INTRAVENOUS AS NEEDED
Status: DISCONTINUED | OUTPATIENT
Start: 2017-10-11 | End: 2017-10-11 | Stop reason: SURG

## 2017-10-11 RX ORDER — PHENYLEPHRINE HCL 10 MG/ML
VIAL (ML) INJECTION AS NEEDED
Status: DISCONTINUED | OUTPATIENT
Start: 2017-10-11 | End: 2017-10-11 | Stop reason: SURG

## 2017-10-11 RX ORDER — SODIUM CHLORIDE 0.9 % (FLUSH) 0.9 %
10 SYRINGE (ML) INJECTION AS NEEDED
Status: DISCONTINUED | OUTPATIENT
Start: 2017-10-11 | End: 2017-10-14

## 2017-10-11 RX ORDER — HYDROCODONE BITARTRATE AND ACETAMINOPHEN 7.5; 325 MG/1; MG/1
2 TABLET ORAL EVERY 4 HOURS PRN
Status: DISCONTINUED | OUTPATIENT
Start: 2017-10-11 | End: 2017-10-14

## 2017-10-11 RX ORDER — FAMOTIDINE 10 MG/ML
INJECTION, SOLUTION INTRAVENOUS
Status: COMPLETED
Start: 2017-10-11 | End: 2017-10-11

## 2017-10-11 RX ORDER — DEXAMETHASONE SODIUM PHOSPHATE 4 MG/ML
VIAL (ML) INJECTION AS NEEDED
Status: DISCONTINUED | OUTPATIENT
Start: 2017-10-11 | End: 2017-10-11 | Stop reason: SURG

## 2017-10-11 RX ORDER — TERBUTALINE SULFATE 1 MG/ML
0.25 INJECTION, SOLUTION SUBCUTANEOUS AS NEEDED
Status: DISCONTINUED | OUTPATIENT
Start: 2017-10-11 | End: 2017-10-11 | Stop reason: HOSPADM

## 2017-10-11 RX ORDER — POLYETHYLENE GLYCOL 3350 17 G/17G
17 POWDER, FOR SOLUTION ORAL DAILY PRN
Status: DISCONTINUED | OUTPATIENT
Start: 2017-10-11 | End: 2017-10-14

## 2017-10-11 RX ORDER — AMMONIA INHALANTS 0.04 G/.3ML
0.3 INHALANT RESPIRATORY (INHALATION) AS NEEDED
Status: DISCONTINUED | OUTPATIENT
Start: 2017-10-11 | End: 2017-10-14

## 2017-10-11 RX ORDER — SODIUM CHLORIDE, SODIUM LACTATE, POTASSIUM CHLORIDE, CALCIUM CHLORIDE 600; 310; 30; 20 MG/100ML; MG/100ML; MG/100ML; MG/100ML
INJECTION, SOLUTION INTRAVENOUS
Status: COMPLETED
Start: 2017-10-11 | End: 2017-10-11

## 2017-10-11 RX ORDER — IBUPROFEN 600 MG/1
600 TABLET ORAL ONCE AS NEEDED
Status: DISCONTINUED | OUTPATIENT
Start: 2017-10-11 | End: 2017-10-11 | Stop reason: HOSPADM

## 2017-10-11 RX ORDER — AMMONIA INHALANTS 0.04 G/.3ML
0.3 INHALANT RESPIRATORY (INHALATION) AS NEEDED
Status: DISCONTINUED | OUTPATIENT
Start: 2017-10-11 | End: 2017-10-11 | Stop reason: HOSPADM

## 2017-10-11 RX ORDER — PRENATAL VIT,CAL 76/IRON/FOLIC 29 MG-1 MG
1 TABLET ORAL DAILY
Status: DISCONTINUED | OUTPATIENT
Start: 2017-10-11 | End: 2017-10-14

## 2017-10-11 RX ORDER — DOCUSATE SODIUM 100 MG/1
100 CAPSULE, LIQUID FILLED ORAL
Status: DISCONTINUED | OUTPATIENT
Start: 2017-10-11 | End: 2017-10-14

## 2017-10-11 RX ORDER — SIMETHICONE 80 MG
80 TABLET,CHEWABLE ORAL 3 TIMES DAILY PRN
Status: DISCONTINUED | OUTPATIENT
Start: 2017-10-11 | End: 2017-10-14

## 2017-10-11 RX ORDER — SODIUM CHLORIDE 0.9 % (FLUSH) 0.9 %
10 SYRINGE (ML) INJECTION AS NEEDED
Status: DISCONTINUED | OUTPATIENT
Start: 2017-10-11 | End: 2017-10-11 | Stop reason: HOSPADM

## 2017-10-11 RX ORDER — SODIUM CHLORIDE, SODIUM LACTATE, POTASSIUM CHLORIDE, CALCIUM CHLORIDE 600; 310; 30; 20 MG/100ML; MG/100ML; MG/100ML; MG/100ML
INJECTION, SOLUTION INTRAVENOUS CONTINUOUS
Status: DISCONTINUED | OUTPATIENT
Start: 2017-10-11 | End: 2017-10-11 | Stop reason: HOSPADM

## 2017-10-11 RX ORDER — ONDANSETRON 2 MG/ML
4 INJECTION INTRAMUSCULAR; INTRAVENOUS EVERY 6 HOURS PRN
Status: DISCONTINUED | OUTPATIENT
Start: 2017-10-11 | End: 2017-10-14

## 2017-10-11 RX ORDER — SODIUM PHOSPHATE, DIBASIC AND SODIUM PHOSPHATE, MONOBASIC 7; 19 G/133ML; G/133ML
1 ENEMA RECTAL ONCE AS NEEDED
Status: DISCONTINUED | OUTPATIENT
Start: 2017-10-11 | End: 2017-10-14

## 2017-10-11 RX ORDER — 0.9 % SODIUM CHLORIDE 0.9 %
VIAL (ML) INJECTION
Status: DISPENSED
Start: 2017-10-11 | End: 2017-10-11

## 2017-10-11 RX ORDER — CLINDAMYCIN PHOSPHATE 900 MG/50ML
900 INJECTION INTRAVENOUS ONCE
Status: DISCONTINUED | OUTPATIENT
Start: 2017-10-11 | End: 2017-10-12

## 2017-10-11 RX ORDER — KETOROLAC TROMETHAMINE 30 MG/ML
INJECTION, SOLUTION INTRAMUSCULAR; INTRAVENOUS AS NEEDED
Status: DISCONTINUED | OUTPATIENT
Start: 2017-10-11 | End: 2017-10-11 | Stop reason: SURG

## 2017-10-11 RX ORDER — LIDOCAINE HYDROCHLORIDE 10 MG/ML
30 INJECTION, SOLUTION EPIDURAL; INFILTRATION; INTRACAUDAL; PERINEURAL ONCE
Status: DISCONTINUED | OUTPATIENT
Start: 2017-10-11 | End: 2017-10-11 | Stop reason: HOSPADM

## 2017-10-11 RX ORDER — DEXTROSE, SODIUM CHLORIDE, SODIUM LACTATE, POTASSIUM CHLORIDE, AND CALCIUM CHLORIDE 5; .6; .31; .03; .02 G/100ML; G/100ML; G/100ML; G/100ML; G/100ML
INJECTION, SOLUTION INTRAVENOUS CONTINUOUS
Status: DISCONTINUED | OUTPATIENT
Start: 2017-10-11 | End: 2017-10-14

## 2017-10-11 RX ORDER — ONDANSETRON 2 MG/ML
INJECTION INTRAMUSCULAR; INTRAVENOUS AS NEEDED
Status: DISCONTINUED | OUTPATIENT
Start: 2017-10-11 | End: 2017-10-11 | Stop reason: SURG

## 2017-10-11 RX ORDER — TRISODIUM CITRATE DIHYDRATE AND CITRIC ACID MONOHYDRATE 500; 334 MG/5ML; MG/5ML
SOLUTION ORAL
Status: COMPLETED
Start: 2017-10-11 | End: 2017-10-11

## 2017-10-11 RX ORDER — LIDOCAINE HYDROCHLORIDE 10 MG/ML
INJECTION, SOLUTION EPIDURAL; INFILTRATION; INTRACAUDAL; PERINEURAL AS NEEDED
Status: DISCONTINUED | OUTPATIENT
Start: 2017-10-11 | End: 2017-10-11 | Stop reason: SURG

## 2017-10-11 RX ORDER — BISACODYL 10 MG
10 SUPPOSITORY, RECTAL RECTAL
Status: DISCONTINUED | OUTPATIENT
Start: 2017-10-11 | End: 2017-10-14

## 2017-10-11 RX ORDER — ACETAMINOPHEN 325 MG/1
650 TABLET ORAL EVERY 4 HOURS PRN
Status: DISCONTINUED | OUTPATIENT
Start: 2017-10-11 | End: 2017-10-14

## 2017-10-11 RX ORDER — TRISODIUM CITRATE DIHYDRATE AND CITRIC ACID MONOHYDRATE 500; 334 MG/5ML; MG/5ML
30 SOLUTION ORAL AS NEEDED
Status: COMPLETED | OUTPATIENT
Start: 2017-10-11 | End: 2017-10-11

## 2017-10-11 RX ORDER — METOCLOPRAMIDE HYDROCHLORIDE 5 MG/ML
INJECTION INTRAMUSCULAR; INTRAVENOUS
Status: COMPLETED
Start: 2017-10-11 | End: 2017-10-11

## 2017-10-11 RX ORDER — ENOXAPARIN SODIUM 100 MG/ML
40 INJECTION SUBCUTANEOUS EVERY 24 HOURS
Status: DISCONTINUED | OUTPATIENT
Start: 2017-10-11 | End: 2017-10-14

## 2017-10-11 RX ADMIN — SODIUM CHLORIDE, SODIUM LACTATE, POTASSIUM CHLORIDE, CALCIUM CHLORIDE: 600; 310; 30; 20 INJECTION, SOLUTION INTRAVENOUS at 04:25:00

## 2017-10-11 RX ADMIN — LIDOCAINE HYDROCHLORIDE 50 MG: 10 INJECTION, SOLUTION EPIDURAL; INFILTRATION; INTRACAUDAL; PERINEURAL at 04:32:00

## 2017-10-11 RX ADMIN — SODIUM CHLORIDE, SODIUM LACTATE, POTASSIUM CHLORIDE, CALCIUM CHLORIDE: 600; 310; 30; 20 INJECTION, SOLUTION INTRAVENOUS at 05:32:00

## 2017-10-11 RX ADMIN — PHENYLEPHRINE HCL 200 MCG: 10 MG/ML VIAL (ML) INJECTION at 04:55:00

## 2017-10-11 RX ADMIN — DEXAMETHASONE SODIUM PHOSPHATE 4 MG: 4 MG/ML VIAL (ML) INJECTION at 04:48:00

## 2017-10-11 RX ADMIN — SODIUM CHLORIDE, SODIUM LACTATE, POTASSIUM CHLORIDE, CALCIUM CHLORIDE: 600; 310; 30; 20 INJECTION, SOLUTION INTRAVENOUS at 04:45:00

## 2017-10-11 RX ADMIN — ONDANSETRON 4 MG: 2 INJECTION INTRAMUSCULAR; INTRAVENOUS at 04:48:00

## 2017-10-11 RX ADMIN — PHENYLEPHRINE HCL 200 MCG: 10 MG/ML VIAL (ML) INJECTION at 05:10:00

## 2017-10-11 RX ADMIN — KETOROLAC TROMETHAMINE 30 MG: 30 INJECTION, SOLUTION INTRAMUSCULAR; INTRAVENOUS at 05:10:00

## 2017-10-11 RX ADMIN — SUCCINYLCHOLINE CHLORIDE 120 MG: 20 INJECTION INTRAMUSCULAR; INTRAVENOUS at 04:32:00

## 2017-10-11 NOTE — PROGRESS NOTES
Patient presented to triage c/o leaking fluid and contractions since 0245. Patient moaning/yelling with contractions. Patient on hands and knees on floor during contractions. Patient helped to triage bed, EFM's applied at [phone]. .   BOW grossly ru

## 2017-10-11 NOTE — OPERATIVE REPORT
[facility]    PATIENT'S NAME: Lj Red   ATTENDING PHYSICIAN: Emily Orellana MD   OPERATING PHYSICIAN: Emily Orellana MD   PATIENT ACCOUNT#:   [id number]    LOCATION:  [address] [place] [address] #:   [id number]       DATE OF BIRTH: at the midline sharply. The DeLee bladder blade was placed, and the vesicouterine peritoneum was incised transversely, reflecting the bladder away inferiorly.   Brisk bleeding vessels from the dome of the bladder were clamped with Robyn clamps and retracte continuing stitch in 2 halves. Subcutaneous tissue was irrigated and dried. Any oozing point was Bovie cauterized, and the skin was closed using stainless steel staples.   The wound was cleaned, dried, and bandaged, and the uterus was massaged and firm, a

## 2017-10-11 NOTE — ANESTHESIA POSTPROCEDURE EVALUATION
Patient: Ross Rader    Procedure Summary     Date:  10/11/17 Room / Location:  [facility] L+D OR  [facility] L+D OR    Anesthesia Start:  425 Anesthesia Stop:      Procedure:   SECTION (N/A ) Diagnosis:  (Prior Uterine Surgery)    Surgeon:  Luís Farooq

## 2017-10-11 NOTE — H&P
[facility] [place] [address] Patient Status:  Inpatient    1975 MRN [id number]   Location [address]  Attending Ada Drake MD   Hosp Day # 0 PCP Freddie Peralta DO     Jan GASTRIC BYPASS,OBESE<100CM KRISH-EN-Y  1998: HEMORRHOIDECTOMY  1999: OTHER SURGICAL HISTORY      Comment: boil surgery  Family History:   Family History   Problem Relation Age of Onset   • High Cholesterol Father    • Diabetes Father    • Breast Cancer Moth Misc Double Electric Breast PUmp equivalent to Medela Pump in Style Disp: 1 each Rfl: 0 Unknown       Review of Systems:   As documented in HPI        Physical Exam:   Temp:  [97.7 °F (36.5 °C)] 97.7 °F (36.5 °C)  Pulse:  [47] 47  Resp:  [18] 18  BP: (128) complications have been discussed in detail with the patient. Pre-admission, admission, and post admission procedures and expectations were discussed in detail.     All questions answered, all appropriate consents will be signed at the Hospital. Admission

## 2017-10-11 NOTE — ANESTHESIA PREPROCEDURE EVALUATION
Anesthesia PreOp Note    HPI:     [place] Memory is a 40year old female who presents for preoperative consultation requested by: Isai Damon MD    Date of Surgery: 10/11/2017    Procedure(s):   SECTION  Indication: Prior Uterine Surgery KHURRAM) 1177 MG Oral Chew Tab  Disp:  Rfl:  10/10/2017 at Unknown time   Cyanocobalamin (B-12) 100 MCG Oral Tab Take by mouth. Disp:  Rfl:  10/10/2017 at Unknown time   Cholecalciferol (VITAMIN D3) 400 UNITS Oral Tab Take by mouth.  Disp:  Rfl:  10/10/201 dexamethasone Sodium Phosphate (DECADRON) 4 MG/ML injection  Intravenous PRN Jahaira Martinez MD 4 mg at 10/11/17 0448   ondansetron HCl (ZOFRAN) injection  Intravenous PRN Jahaira Martinez MD 4 mg at 10/11/17 0448   Clindamycin (CLEOCIN) 900mg i RBC 3.86 10/11/2017   HGB 11.4 (L) 10/11/2017   HCT 33.5 (L) 10/11/2017   MCV 86.9 10/11/2017   MCH 29.5 10/11/2017   MCHC 33.9 10/11/2017   RDW 12.8 10/11/2017    10/11/2017   MPV 9.6 10/11/2017             Vital Signs:   Body mass index is 28.53

## 2017-10-11 NOTE — BRIEF OP NOTE
Pre-Operative Diagnosis: Prev  section  36 1/7 wks in active labor, acute  labor s/p SROM  Decline      Post-Operative Diagnosis: same     Procedure Performed:   Procedure(s):  Urgent repeat low transverse  section  Umbilical

## 2017-10-11 NOTE — LACTATION NOTE
LACTATION NOTE - MOTHER      Evaluation Type: Inpatient    Problems identified  Problems identified: Knowledge deficit    Maternal history  Maternal history: AMA;  section  Other/comment: gastric bypass surgery, heart murmur, carpal tunnel syndrome,

## 2017-10-11 NOTE — PROGRESS NOTES
Patient transferred from recovery room to room 365 in stable condition and transferred from cart to bed. Baby at bedside in [facility]. VSS. Lochia small and fundus firm at U. Cord blood and tissue were collected and kit is at patient's bedside.  She is aware

## 2017-10-12 RX ORDER — SODIUM CHLORIDE, SODIUM LACTATE, POTASSIUM CHLORIDE, CALCIUM CHLORIDE 600; 310; 30; 20 MG/100ML; MG/100ML; MG/100ML; MG/100ML
INJECTION, SOLUTION INTRAVENOUS CONTINUOUS
Status: DISCONTINUED | OUTPATIENT
Start: 2017-10-12 | End: 2017-10-14 | Stop reason: HOSPADM

## 2017-10-12 RX ORDER — DIPHENHYDRAMINE HYDROCHLORIDE 50 MG/ML
25 INJECTION INTRAMUSCULAR; INTRAVENOUS ONCE AS NEEDED
Status: ACTIVE | OUTPATIENT
Start: 2017-10-12 | End: 2017-10-12

## 2017-10-12 RX ORDER — IBUPROFEN 600 MG/1
600 TABLET ORAL EVERY 6 HOURS PRN
Status: DISCONTINUED | OUTPATIENT
Start: 2017-10-12 | End: 2017-10-14

## 2017-10-12 RX ORDER — ONDANSETRON 2 MG/ML
4 INJECTION INTRAMUSCULAR; INTRAVENOUS ONCE AS NEEDED
Status: ACTIVE | OUTPATIENT
Start: 2017-10-12 | End: 2017-10-12

## 2017-10-12 RX ORDER — KETOROLAC TROMETHAMINE 30 MG/ML
30 INJECTION, SOLUTION INTRAMUSCULAR; INTRAVENOUS ONCE AS NEEDED
Status: COMPLETED | OUTPATIENT
Start: 2017-10-12 | End: 2017-10-12

## 2017-10-12 RX ORDER — IBUPROFEN 600 MG/1
600 TABLET ORAL EVERY 6 HOURS PRN
Status: DISCONTINUED | OUTPATIENT
Start: 2017-10-12 | End: 2017-10-12

## 2017-10-12 RX ORDER — NALBUPHINE HCL 10 MG/ML
2.5 AMPUL (ML) INJECTION
Status: DISCONTINUED | OUTPATIENT
Start: 2017-10-12 | End: 2017-10-14 | Stop reason: HOSPADM

## 2017-10-12 NOTE — LACTATION NOTE
LACTATION NOTE - MOTHER      Evaluation Type: Inpatient    Problems identified  Problems identified: Knowledge deficit    Maternal history  Other/comment: gastric bypass surgery (Nehemias-N-Y),, heart murmur, carpal tunnel syndrome, arthritis, alopecia, BRCA-1

## 2017-10-12 NOTE — PLAN OF CARE
POSTPARTUM    • Establishment of adequate milk supply with medication/procedure interruptions Not Progressing

## 2017-10-12 NOTE — PROGRESS NOTES
[facility]    OB/GYNE Progress Note      Christina Paniagua Patient Status:  Inpatient    1975 MRN [id number]   Location [facility] 3SE Attending Darlnee Cam MD   [facility] Day # 1 PCP Jennifer Givens DO       Assessment/P 06/15/2017    (H) 06/15/2017   CA 8.6 06/15/2017   ALB 2.9 (L) 06/15/2017   ALKPHO 59 06/15/2017   BILT 0.5 06/15/2017   TP 5.9 06/15/2017   AST 19 06/15/2017   ALT 14 06/15/2017         Lab Results  Component Value Date   RPR Non-Reactive 05/14/201

## 2017-10-12 NOTE — LACTATION NOTE
This note was copied from a baby's chart. LACTATION NOTE - INFANT    Evaluation Type  Evaluation Type: Inpatient    Problems & Assessment  Problems Diagnosed or Identified: Latch difficulty; Shallow latch  Problems: comment/detail: LPT  Muscle tone: Approp

## 2017-10-12 NOTE — LACTATION NOTE
LACTATION NOTE - MOTHER      Evaluation Type: Inpatient    Problems identified  Problems identified: Knowledge deficit    Maternal history  Maternal history:  section  Other/comment: gastric bypass surgery, heart murmur, carpal tunnel syndrome, art

## 2017-10-12 NOTE — LACTATION NOTE
This note was copied from a baby's chart. LACTATION NOTE - INFANT    Evaluation Type  Evaluation Type: Inpatient    Problems & Assessment  Problems Diagnosed or Identified: Latch difficulty; Shallow latch  Problems: comment/detail: LPT  Infant Assessment: to support as needed.

## 2017-10-13 NOTE — PROGRESS NOTES
[facility]    Post-Partum Caesarean Section Progress Note    Lana Perry Patient Status:  Inpatient    1975 MRN [id number]   Location [facility] 3SE Attending Andreas Eric MD   [facility] Day # 2 PCP JERMAINE Huerta

## 2017-10-14 ENCOUNTER — TELEPHONE (OUTPATIENT)
Dept: PEDIATRICS CLINIC | Facility: CLINIC | Age: 42
End: 2017-10-14

## 2017-10-14 VITALS
HEART RATE: 80 BPM | TEMPERATURE: 98 F | OXYGEN SATURATION: 97 % | HEIGHT: 61 IN | WEIGHT: 151 LBS | SYSTOLIC BLOOD PRESSURE: 127 MMHG | RESPIRATION RATE: 16 BRPM | BODY MASS INDEX: 28.51 KG/M2 | DIASTOLIC BLOOD PRESSURE: 77 MMHG

## 2017-10-14 RX ORDER — HYDROCODONE BITARTRATE AND ACETAMINOPHEN 7.5; 325 MG/1; MG/1
1 TABLET ORAL EVERY 4 HOURS PRN
Qty: 12 TABLET | Refills: 0 | Status: SHIPPED | OUTPATIENT
Start: 2017-10-14 | End: 2017-11-24

## 2017-10-14 RX ORDER — MELATONIN
325 2 TIMES DAILY WITH MEALS
Status: DISCONTINUED | OUTPATIENT
Start: 2017-10-14 | End: 2017-10-14

## 2017-10-14 RX ORDER — IBUPROFEN 600 MG/1
600 TABLET ORAL EVERY 6 HOURS PRN
Qty: 15 TABLET | Refills: 0 | Status: SHIPPED | OUTPATIENT
Start: 2017-10-14 | End: 2017-11-24

## 2017-10-14 NOTE — PLAN OF CARE
BREAST FEEDING    • Optimize infant feeding at the breast Progressing        GENITOURINARY - ADULT    • Absence of urinary retention Progressing        Patient Centered Care    • Patient preferences are identified and integrated in the patient's plan of ca

## 2017-10-14 NOTE — PROGRESS NOTES
PATIENT DISCHARGED HOME VIA WHEELCHAIR WITH HER , BABY, AND 3YEAR OLD SON. FOLLOW UP CARE, MEDICATIONS, AND POSTPARTUM PRECAUTIONS REVIEWED.       IN DEPTH EDUCATION PROVIDED WITH PT AND HER  REGARDING POSTPARTUM DEPRESSION PRECAUTIONS AND A

## 2017-10-14 NOTE — LACTATION NOTE
Mom c/o sore cracked scabbed nipples, lanolin and gel pads given, reviewed flange fit and pump settings. Discussed hospital grade rental, mom is checking with [employer] company to obtain pump.   Jd Schumacher, 10/14/17, 11:24 AM

## 2017-10-14 NOTE — LACTATION NOTE
This note was copied from a baby's chart. Mom states she is no longer breastfeeding or pumping. She is formula feeding infant.   Medardo Ruiz, 10/14/17, 9:14 AM

## 2017-10-14 NOTE — DISCHARGE SUMMARY
[place] [facility]    OB/GYNE Discharge Note      Man Kumar Patient Status:  Inpatient    1975 MRN [id number]   Location [facility] 3SE Attending Susana Zhang MD   [address] Day # 3 PCP Fantasma Kunz DO     Admit date: Avoid driving for two weeks. Take prescribed pain medication (or any other medication) as needed. Schedule six weeks postpartum visit. General diet as tolerated. Drink plenty of fluids.       Shannon Love MD  10/14/2017  7:53 AM

## 2017-10-16 ENCOUNTER — TELEPHONE (OUTPATIENT)
Dept: LACTATION | Facility: HOSPITAL | Age: 42
End: 2017-10-16

## 2017-10-16 ENCOUNTER — NURSE ONLY (OUTPATIENT)
Dept: OBGYN CLINIC | Facility: CLINIC | Age: 42
End: 2017-10-16

## 2017-10-16 DIAGNOSIS — Z48.02 ENCOUNTER FOR STAPLE REMOVAL: Primary | ICD-10-CM

## 2017-10-16 PROCEDURE — 99024 POSTOP FOLLOW-UP VISIT: CPT | Performed by: OBSTETRICS & GYNECOLOGY

## 2017-10-16 NOTE — TELEPHONE ENCOUNTER
Mom missed OP Lactation visit. Rescheduled for tomorrow @1100. She is still getting barely drops when pumping, hx insufficient milk supply. Will try hands-on pumping.

## 2017-10-17 ENCOUNTER — NURSE ONLY (OUTPATIENT)
Dept: LACTATION | Facility: HOSPITAL | Age: 42
End: 2017-10-17
Payer: COMMERCIAL

## 2017-10-17 PROCEDURE — 99213 OFFICE O/P EST LOW 20 MIN: CPT | Performed by: OBSTETRICS & GYNECOLOGY

## 2017-10-17 NOTE — PATIENT INSTRUCTIONS
[email or web address]   Follow Me Mum video  Www.[facility] [employer] [facility]         Many breastfeeding videos  [employer]. [email or web address]          Reputable information on breastfeeding  Breastfeed, Shirlene!  facebook page and website      Well moderated site with accurate inf

## 2017-10-17 NOTE — PROGRESS NOTES
Mom has a history of very low milk supply with her previous baby. She experienced engorgement on 10/14. She rented a [employer] but was unable to yield more than drops. She was counseled on the phone on 10/15 for engorgement.   Mom has been putting baby to

## 2017-10-18 ENCOUNTER — TELEPHONE (OUTPATIENT)
Dept: OBGYN CLINIC | Facility: CLINIC | Age: 42
End: 2017-10-18

## 2017-10-18 NOTE — TELEPHONE ENCOUNTER
Pt would like an update on [facility] paperwork for her spouse Katelynn Ramirez, states dropped off at [employer]  On 10/16. pls adv.

## 2017-11-24 ENCOUNTER — POSTPARTUM (OUTPATIENT)
Dept: OBGYN CLINIC | Facility: CLINIC | Age: 42
End: 2017-11-24

## 2017-11-24 VITALS
BODY MASS INDEX: 25 KG/M2 | SYSTOLIC BLOOD PRESSURE: 119 MMHG | HEART RATE: 66 BPM | WEIGHT: 132 LBS | DIASTOLIC BLOOD PRESSURE: 80 MMHG

## 2017-11-24 DIAGNOSIS — Z12.4 SCREENING FOR MALIGNANT NEOPLASM OF CERVIX: ICD-10-CM

## 2017-11-24 PROBLEM — O47.9 UTERINE CONTRACTIONS DURING PREGNANCY: Status: RESOLVED | Noted: 2017-10-11 | Resolved: 2017-11-24

## 2017-11-24 PROBLEM — O47.9 UTERINE CONTRACTIONS DURING PREGNANCY (HCC): Status: RESOLVED | Noted: 2017-10-11 | Resolved: 2017-11-24

## 2017-11-24 RX ORDER — MELATONIN
325
COMMUNITY

## 2017-11-24 NOTE — PROGRESS NOTES
HPI:    Patient ID: Marti Liz is a 40year old female. HPI  6 week postpartum visit  Status post repeat  section. No complaints. Stop nursing.   Is planning to see the surgical oncologist in January with the intention of having prophylacti Breast Cancer Mother 48     also 58; BRCA1 +   • Diabetes Mother    • Hypertension Mother    • Obesity Mother    • Ovarian Cancer Maternal Grandmother 50     d. 48   • Breast Cancer Maternal Aunt 27   • Cancer Maternal Cousin Female 50     lung ca; d.50   • fullness. No erythema, tenderness or bleeding in the vagina. No signs of injury around the vagina. No vaginal discharge found. Genitourinary Comments: Breasts normal without masses.     Pelvic:  Normal external genitalia  Vagina:  Normal without lesions o

## 2018-01-23 ENCOUNTER — OFFICE VISIT (OUTPATIENT)
Dept: SURGERY | Facility: CLINIC | Age: 43
End: 2018-01-23

## 2018-01-23 VITALS
OXYGEN SATURATION: 99 % | TEMPERATURE: 98 F | RESPIRATION RATE: 18 BRPM | HEIGHT: 61 IN | DIASTOLIC BLOOD PRESSURE: 76 MMHG | SYSTOLIC BLOOD PRESSURE: 123 MMHG | BODY MASS INDEX: 24.73 KG/M2 | WEIGHT: 131 LBS | HEART RATE: 56 BPM

## 2018-01-23 DIAGNOSIS — Z15.09 BRCA POSITIVE: Primary | ICD-10-CM

## 2018-01-23 DIAGNOSIS — Z15.01 BRCA1 GENETIC CARRIER: ICD-10-CM

## 2018-01-23 DIAGNOSIS — Z15.09 BRCA1 GENETIC CARRIER: ICD-10-CM

## 2018-01-23 DIAGNOSIS — Z15.01 BRCA POSITIVE: Primary | ICD-10-CM

## 2018-01-23 DIAGNOSIS — R92.2 DENSE BREASTS: ICD-10-CM

## 2018-01-23 PROCEDURE — 99215 OFFICE O/P EST HI 40 MIN: CPT | Performed by: SURGERY

## 2018-01-23 NOTE — PROGRESS NOTES
Breast Surgery High Risk Surveillance    History of Present Illness:   Ms. Teddy Mendez is a 40year old woman who presented with a strong family history of breast and ovarian cancer and a confirmed BRCA mutation.  She is now 3 months postpartum and witho mouth daily with breakfast. Disp:  Rfl:    PRENATAL 27-0.8 MG Oral Tab Take 1 tablet by mouth daily. Disp:  Rfl:    Calcium Carbonate Antacid (TUMS CHEWY DELIGHTS) 1177 MG Oral Chew Tab  Disp:  Rfl:    Cyanocobalamin (B-12) 100 MCG Oral Tab Take by mouth. in vision or color blindness. The patient denies hearing loss, ringing in the ears, ear drainage, earaches, nasal congestion, nose bleeds, snoring, pain in mouth/throat, hoarseness, change in voice, facial trauma.     Respiratory:  The patient denies chroni relationship, bipolar disorder, sleep disturbance, anxiety, depression or feeling of despair. Endocrine: There is no history of poor/slow wound healing, weight loss/gain, fertility or hormone problems, cold intolerance, thyroid disease.      Allergic/ cervical regions are free of significant lymphadenopathy. Back: There is no vertebral column tenderness. Skin: The skin appears normal. There are no suspicious appearing rashes or lesions. Extremities:  The extremities are without deformity, cyanos before the earliest age of first diagnosis of ovarian cancer in the family.    7. Consider chemopreventative strategies for breast and ovarian cancer such as tamoxifen, raloxifene, exemestane for breast cancer and oral contraceptive medication (controversia

## 2018-01-26 ENCOUNTER — OFFICE VISIT (OUTPATIENT)
Dept: SURGERY | Facility: CLINIC | Age: 43
End: 2018-01-26

## 2018-01-26 VITALS
TEMPERATURE: 99 F | WEIGHT: 132.38 LBS | HEART RATE: 92 BPM | SYSTOLIC BLOOD PRESSURE: 120 MMHG | BODY MASS INDEX: 25 KG/M2 | DIASTOLIC BLOOD PRESSURE: 74 MMHG

## 2018-01-26 DIAGNOSIS — Z84.81 FHX: BRCA1 GENE POSITIVE: ICD-10-CM

## 2018-01-26 DIAGNOSIS — Z80.3 FAMILY HISTORY OF MALIGNANT NEOPLASM OF BREAST: Primary | ICD-10-CM

## 2018-01-26 DIAGNOSIS — Z01.818 PREOP TESTING: ICD-10-CM

## 2018-01-26 PROCEDURE — 99243 OFF/OP CNSLTJ NEW/EST LOW 30: CPT | Performed by: SURGERY

## 2018-01-26 NOTE — CONSULTS
New Patient Consultation    This is the first visit for this 40year old female with a history of BRCA genetic mutation    History of Present Illness:    The patient is a 40year old referred by Dr. Lennie Pearson to discuss reconstruction following bilat Disp:  Rfl:    Cholecalciferol (VITAMIN D3) 400 UNITS Oral Tab Take by mouth. Disp:  Rfl:    Omega-3-acid Ethyl Esters 1 G Oral Cap Take 1 g by mouth daily. Disp:  Rfl:      No current facility-administered medications on file prior to visit.        Penny Cooper double vision, cataracts, glaucoma, nasal congestion, nosebleed, hoarseness, sore throat, or swollen glands    Respiratory:   The patient denies shortness of breath, cough, bloody cough, phlegm, asthma, or wheezing    Cardiovascular:   The patient denies ch 120/74 (BP Location: Left arm, Patient Position: Sitting, Cuff Size: adult)   Pulse 92   Temp 99.4 °F (37.4 °C) (Tympanic)   Wt 60.1 kg (132 lb 6.4 oz)   BMI 25.02 kg/m²     The patient is awake, alert, and oriented.    She is a well-nourished, well-develop stage Jones pattern approach. Representative photographs this technique were demonstrated to the patient. The excess lower pole skin a stage Wise pattern approach was described to the patient.     The nature and technique of tissue expander reconstruction discussed the possible need for revisions as well as the process of nipple areolar reconstruction. . Multiple questions were answered to the patient's satisfaction. No guarantees as to outcome offered.  The patient will like to proceed with immediate flap ba

## 2018-01-27 ENCOUNTER — TELEPHONE (OUTPATIENT)
Dept: INTERNAL MEDICINE CLINIC | Facility: CLINIC | Age: 43
End: 2018-01-27

## 2018-01-27 RX ORDER — OSELTAMIVIR PHOSPHATE 75 MG/1
75 CAPSULE ORAL 2 TIMES DAILY
Qty: 10 CAPSULE | Refills: 0 | Status: SHIPPED | OUTPATIENT
Start: 2018-01-27 | End: 2018-02-27

## 2018-01-27 NOTE — TELEPHONE ENCOUNTER
Pt's family has flu last week; she woke up thi smorning with severe body aches, fatigue, runny nose. Advised tylenol alternating with ibuprofen; given rx for tamiflu. Encouraged to hydration with gatorade.  Call if symptoms worsen    Nursing follow up o

## 2018-01-29 ENCOUNTER — TELEPHONE (OUTPATIENT)
Dept: SURGERY | Facility: CLINIC | Age: 43
End: 2018-01-29

## 2018-01-29 DIAGNOSIS — Z01.818 PRE-OP TESTING: Primary | ICD-10-CM

## 2018-01-29 NOTE — TELEPHONE ENCOUNTER
Called and spoke to Pt to inform her that her CTA orders have been placed and gave her the number for central scheduling. I also gave her Karyn's number in case she has any questions about insurance/billing. All questions from Pt have been answered.

## 2018-01-30 ENCOUNTER — HOSPITAL ENCOUNTER (OUTPATIENT)
Dept: MAMMOGRAPHY | Age: 43
Discharge: HOME | End: 2018-01-30
Attending: SURGERY
Payer: COMMERCIAL

## 2018-01-30 DIAGNOSIS — Z15.01 BRCA POSITIVE: ICD-10-CM

## 2018-01-30 DIAGNOSIS — Z15.09 BRCA POSITIVE: ICD-10-CM

## 2018-01-30 PROCEDURE — 77063 BREAST TOMOSYNTHESIS BI: CPT | Performed by: SURGERY

## 2018-01-30 PROCEDURE — 77067 SCR MAMMO BI INCL CAD: CPT | Performed by: SURGERY

## 2018-02-01 ENCOUNTER — TELEPHONE (OUTPATIENT)
Dept: SURGERY | Facility: CLINIC | Age: 43
End: 2018-02-01

## 2018-02-01 NOTE — TELEPHONE ENCOUNTER
Notified patient that Dr Medardo Sanchez reviewed her recent mammogram, and it looks normal.   Will proceed with prophylactic mastectomies with reconstruction per Dr Paige Orellana.

## 2018-02-27 ENCOUNTER — OFFICE VISIT (OUTPATIENT)
Dept: INTERNAL MEDICINE CLINIC | Facility: CLINIC | Age: 43
End: 2018-02-27

## 2018-02-27 VITALS
HEART RATE: 77 BPM | WEIGHT: 132 LBS | HEIGHT: 60 IN | DIASTOLIC BLOOD PRESSURE: 72 MMHG | TEMPERATURE: 98 F | SYSTOLIC BLOOD PRESSURE: 116 MMHG | BODY MASS INDEX: 25.91 KG/M2 | OXYGEN SATURATION: 99 %

## 2018-02-27 DIAGNOSIS — L63.9 ALOPECIA AREATA: ICD-10-CM

## 2018-02-27 DIAGNOSIS — Z98.890 H/O LEEP: ICD-10-CM

## 2018-02-27 DIAGNOSIS — M22.8X1 PATELLAR MALTRACKING, RIGHT: ICD-10-CM

## 2018-02-27 DIAGNOSIS — Z80.41 FAMILY HISTORY OF MALIGNANT NEOPLASM OF OVARY: ICD-10-CM

## 2018-02-27 DIAGNOSIS — Z00.00 PHYSICAL EXAM: ICD-10-CM

## 2018-02-27 DIAGNOSIS — L70.0 ACNE VULGARIS: ICD-10-CM

## 2018-02-27 DIAGNOSIS — Z01.818 PREOP EXAMINATION: Primary | ICD-10-CM

## 2018-02-27 DIAGNOSIS — Z98.84 H/O GASTRIC BYPASS: ICD-10-CM

## 2018-02-27 DIAGNOSIS — Z84.81 FHX: BRCA1 GENE POSITIVE: ICD-10-CM

## 2018-02-27 DIAGNOSIS — Z80.3 FAMILY HISTORY OF MALIGNANT NEOPLASM OF BREAST: ICD-10-CM

## 2018-02-27 PROBLEM — O09.299 H/O PRE-ECLAMPSIA IN PRIOR PREGNANCY, CURRENTLY PREGNANT: Status: RESOLVED | Noted: 2017-04-03 | Resolved: 2018-02-27

## 2018-02-27 PROBLEM — Z98.891 H/O CESAREAN SECTION: Status: RESOLVED | Noted: 2017-04-03 | Resolved: 2018-02-27

## 2018-02-27 PROBLEM — O34.219 PREVIOUS CESAREAN DELIVERY AFFECTING PREGNANCY, DELIVERED (HCC): Status: RESOLVED | Noted: 2017-10-11 | Resolved: 2018-02-27

## 2018-02-27 PROBLEM — Z91.89 AT RISK FOR POSTPARTUM DEPRESSION: Status: RESOLVED | Noted: 2017-08-09 | Resolved: 2018-02-27

## 2018-02-27 PROBLEM — O34.219 PREVIOUS CESAREAN DELIVERY AFFECTING PREGNANCY, DELIVERED: Status: RESOLVED | Noted: 2017-10-11 | Resolved: 2018-02-27

## 2018-02-27 PROBLEM — O09.299 H/O PRE-ECLAMPSIA IN PRIOR PREGNANCY, CURRENTLY PREGNANT (HCC): Status: RESOLVED | Noted: 2017-04-03 | Resolved: 2018-02-27

## 2018-02-27 PROBLEM — Z86.32 H/O GESTATIONAL DIABETES IN PRIOR PREGNANCY, CURRENTLY PREGNANT: Status: RESOLVED | Noted: 2017-04-03 | Resolved: 2018-02-27

## 2018-02-27 PROBLEM — O09.523 ELDERLY MULTIGRAVIDA IN THIRD TRIMESTER: Status: RESOLVED | Noted: 2017-04-03 | Resolved: 2018-02-27

## 2018-02-27 PROBLEM — Z86.32 H/O GESTATIONAL DIABETES IN PRIOR PREGNANCY, CURRENTLY PREGNANT (HCC): Status: RESOLVED | Noted: 2017-04-03 | Resolved: 2018-02-27

## 2018-02-27 PROBLEM — O09.523 ELDERLY MULTIGRAVIDA IN THIRD TRIMESTER (HCC): Status: RESOLVED | Noted: 2017-04-03 | Resolved: 2018-02-27

## 2018-02-27 PROBLEM — O09.299 H/O GESTATIONAL DIABETES IN PRIOR PREGNANCY, CURRENTLY PREGNANT: Status: RESOLVED | Noted: 2017-04-03 | Resolved: 2018-02-27

## 2018-02-27 PROBLEM — O09.299 H/O GESTATIONAL DIABETES IN PRIOR PREGNANCY, CURRENTLY PREGNANT (HCC): Status: RESOLVED | Noted: 2017-04-03 | Resolved: 2018-02-27

## 2018-02-27 PROCEDURE — 99396 PREV VISIT EST AGE 40-64: CPT | Performed by: INTERNAL MEDICINE

## 2018-02-27 PROCEDURE — 93000 ELECTROCARDIOGRAM COMPLETE: CPT | Performed by: INTERNAL MEDICINE

## 2018-02-27 PROCEDURE — 93005 ELECTROCARDIOGRAM TRACING: CPT | Performed by: INTERNAL MEDICINE

## 2018-02-27 RX ORDER — CLOBETASOL PROPIONATE 0.46 MG/ML
SOLUTION TOPICAL 2 TIMES DAILY
COMMUNITY
End: 2018-11-06

## 2018-02-27 NOTE — PATIENT INSTRUCTIONS
1. Preop examination  Preop statement: This patient is in optimal medical condition for proposed surgery, procede, pre-operative testing reviewed and no ernesto-operative b-blocker recommended, ernesto-op antibiotic per surgery    2.  Acne vulgaris  Cont with spe

## 2018-02-27 NOTE — PROGRESS NOTES
HPI:   Nando Bowen is a 44year old female who presents for a complete physical exam.     Patient complains of: Patient presents with:  Pre-Op Exam: Patient presents for surgical clearance for B/L masectomy with recontruction on 4/9/2018 at [address] [place] [address].  Ederai Maternal Cousin Female 48     lung ca; d.50   • Other[other] [OTHER] Maternal Grandfather      kidney failure   • Cancer Other    • Diabetes Other      paternal   • Arthritis Other    • Heart Disease Other      family h/o   • Heart Disease Paternal Héctorjayjay Luana Alert and oriented, in no acute distress   HEENT: Pupils equal and reactive to light and accommodation, moist mucous membranes  Neck exam:  Supple with baseline range of motion.   Normal thyroid trachea midline, no JVD  Heart exam: Regular rate and rhythm n artifact    10.  Patellar maltracking:  Wear the knee brace as needed during exercise or activity  Use the extension exercises as discussed, maybe visit the orthopedic surgeon if not getting better.    -remember the collogen protein, prostat liquid grape li

## 2018-03-27 ENCOUNTER — TELEPHONE (OUTPATIENT)
Dept: INTERNAL MEDICINE CLINIC | Facility: CLINIC | Age: 43
End: 2018-03-27

## 2018-03-27 ENCOUNTER — APPOINTMENT (OUTPATIENT)
Dept: LAB | Facility: HOSPITAL | Age: 43
End: 2018-03-27
Attending: INTERNAL MEDICINE
Payer: COMMERCIAL

## 2018-03-27 DIAGNOSIS — Z01.818 PREOP TESTING: ICD-10-CM

## 2018-03-27 DIAGNOSIS — Z00.00 PHYSICAL EXAM: ICD-10-CM

## 2018-03-27 DIAGNOSIS — R82.90 ABNORMAL URINALYSIS: Primary | ICD-10-CM

## 2018-03-27 LAB
ALBUMIN SERPL BCP-MCNC: 3.9 G/DL (ref 3.5–4.8)
ALBUMIN/GLOB SERPL: 1.2 {RATIO} (ref 1–2)
ALP SERPL-CCNC: 96 U/L (ref 32–100)
ALT SERPL-CCNC: 25 U/L (ref 14–54)
ANION GAP SERPL CALC-SCNC: 10 MMOL/L (ref 0–18)
AST SERPL-CCNC: 25 U/L (ref 15–41)
BASOPHILS # BLD: 0 K/UL (ref 0–0.2)
BASOPHILS NFR BLD: 1 %
BILIRUB SERPL-MCNC: 0.5 MG/DL (ref 0.3–1.2)
BILIRUB UR QL: NEGATIVE
BUN SERPL-MCNC: 10 MG/DL (ref 8–20)
BUN/CREAT SERPL: 12.8 (ref 10–20)
CALCIUM SERPL-MCNC: 9.2 MG/DL (ref 8.5–10.5)
CHLORIDE SERPL-SCNC: 103 MMOL/L (ref 95–110)
CHOLEST SERPL-MCNC: 208 MG/DL (ref 110–200)
CO2 SERPL-SCNC: 27 MMOL/L (ref 22–32)
COLOR UR: YELLOW
CREAT SERPL-MCNC: 0.78 MG/DL (ref 0.5–1.5)
EOSINOPHIL # BLD: 0.1 K/UL (ref 0–0.7)
EOSINOPHIL NFR BLD: 2 %
ERYTHROCYTE [DISTWIDTH] IN BLOOD BY AUTOMATED COUNT: 15.4 % (ref 11–15)
GLOBULIN PLAS-MCNC: 3.3 G/DL (ref 2.5–3.7)
GLUCOSE SERPL-MCNC: 94 MG/DL (ref 70–99)
GLUCOSE UR-MCNC: NEGATIVE MG/DL
HCT VFR BLD AUTO: 34.9 % (ref 35–48)
HDLC SERPL-MCNC: 89 MG/DL
HGB BLD-MCNC: 11.7 G/DL (ref 12–16)
HGB UR QL STRIP.AUTO: NEGATIVE
KETONES UR-MCNC: NEGATIVE MG/DL
LDLC SERPL CALC-MCNC: 105 MG/DL (ref 0–99)
LYMPHOCYTES # BLD: 2.3 K/UL (ref 1–4)
LYMPHOCYTES NFR BLD: 43 %
MCH RBC QN AUTO: 28.5 PG (ref 27–32)
MCHC RBC AUTO-ENTMCNC: 33.6 G/DL (ref 32–37)
MCV RBC AUTO: 84.9 FL (ref 80–100)
MONOCYTES # BLD: 0.4 K/UL (ref 0–1)
MONOCYTES NFR BLD: 7 %
NEUTROPHILS # BLD AUTO: 2.6 K/UL (ref 1.8–7.7)
NEUTROPHILS NFR BLD: 49 %
NITRITE UR QL STRIP.AUTO: NEGATIVE
NONHDLC SERPL-MCNC: 119 MG/DL
OSMOLALITY UR CALC.SUM OF ELEC: 289 MOSM/KG (ref 275–295)
PATIENT FASTING: YES
PH UR: 5 [PH] (ref 5–8)
PLATELET # BLD AUTO: 220 K/UL (ref 140–400)
PMV BLD AUTO: 8.8 FL (ref 7.4–10.3)
POTASSIUM SERPL-SCNC: 4 MMOL/L (ref 3.3–5.1)
PREALB SERPL-MCNC: 25.8 MG/DL (ref 17.1–100)
PROT SERPL-MCNC: 7.2 G/DL (ref 5.9–8.4)
PROT UR-MCNC: NEGATIVE MG/DL
RBC # BLD AUTO: 4.11 M/UL (ref 3.7–5.4)
RBC #/AREA URNS AUTO: 1 /HPF
SODIUM SERPL-SCNC: 140 MMOL/L (ref 136–144)
SP GR UR STRIP: 1.02 (ref 1–1.03)
TRIGL SERPL-MCNC: 72 MG/DL (ref 1–149)
TSH SERPL-ACNC: 3.81 UIU/ML (ref 0.45–5.33)
UROBILINOGEN UR STRIP-ACNC: 2
VIT C UR-MCNC: NEGATIVE MG/DL
WBC # BLD AUTO: 5.4 K/UL (ref 4–11)
WBC #/AREA URNS AUTO: 1 /HPF

## 2018-03-27 PROCEDURE — 84443 ASSAY THYROID STIM HORMONE: CPT

## 2018-03-27 PROCEDURE — 85025 COMPLETE CBC W/AUTO DIFF WBC: CPT | Performed by: SURGERY

## 2018-03-27 PROCEDURE — 82306 VITAMIN D 25 HYDROXY: CPT

## 2018-03-27 PROCEDURE — 84134 ASSAY OF PREALBUMIN: CPT

## 2018-03-27 PROCEDURE — 80050 GENERAL HEALTH PANEL: CPT

## 2018-03-27 PROCEDURE — 81001 URINALYSIS AUTO W/SCOPE: CPT

## 2018-03-27 PROCEDURE — 80061 LIPID PANEL: CPT

## 2018-03-27 PROCEDURE — 36415 COLL VENOUS BLD VENIPUNCTURE: CPT | Performed by: SURGERY

## 2018-03-27 PROCEDURE — 80053 COMPREHEN METABOLIC PANEL: CPT | Performed by: SURGERY

## 2018-03-27 NOTE — TELEPHONE ENCOUNTER
To Dr. Rolanda Humphreys - please see u/a results - pt denies UTI sx:  fever/chills/urgency/dysuria/frequency.

## 2018-03-27 NOTE — TELEPHONE ENCOUNTER
I note 3/27/18 UA–large leukocyte esterase, few bacteria but only 1 WBC and 1 RBC. This can wait for Dr. Alayna Wiley.  Routed to him.

## 2018-03-28 LAB — 25(OH)D3 SERPL-MCNC: 24.6 NG/ML

## 2018-03-30 NOTE — TELEPHONE ENCOUNTER
Nursing, please call patient, let her know the urinalysis came abnormal, I want her to repeat this with reflexive culture this time, nursing please have her go to the lab in the next few days to submit another urine sample and we will contact her next week

## 2018-04-02 ENCOUNTER — HOSPITAL ENCOUNTER (OUTPATIENT)
Dept: CT IMAGING | Facility: HOSPITAL | Age: 43
Discharge: HOME | End: 2018-04-02
Attending: SURGERY
Payer: COMMERCIAL

## 2018-04-02 ENCOUNTER — TELEPHONE (OUTPATIENT)
Dept: INTERNAL MEDICINE CLINIC | Facility: CLINIC | Age: 43
End: 2018-04-02

## 2018-04-02 DIAGNOSIS — Z01.818 PRE-OP TESTING: ICD-10-CM

## 2018-04-02 PROCEDURE — 74174 CTA ABD&PLVS W/CONTRAST: CPT | Performed by: SURGERY

## 2018-04-02 RX ORDER — ALPRAZOLAM 0.25 MG/1
0.25 TABLET ORAL 3 TIMES DAILY PRN
Qty: 20 TABLET | Refills: 0 | Status: SHIPPED
Start: 2018-04-02 | End: 2018-11-06

## 2018-04-02 NOTE — TELEPHONE ENCOUNTER
Pt. Is having surgery on 04/09 pt. Needs anxiety meds she needs something to calm her down, she is arguing with people, yelling, crying she would like to speak with a nurse  Ph. # [phone]   Annel ph.  # [phone]    Routed to clinical

## 2018-04-02 NOTE — TELEPHONE ENCOUNTER
scheduled for bilateral mastectomy with reconstruction 4/9. She has been very on edge and overwhelmed about the upcoming surgery. Reports episodes of crying and thoughts of cancelling the surgery.  She would like some sort of anxiety medication to start now

## 2018-04-02 NOTE — TELEPHONE ENCOUNTER
Noted, to calm her nerves I would recommend alprazolam 0.25 mg 3 times daily as needed, nursing send her in my prescription for 20 tablets,, faxable version is in my out box. Nursing call now.

## 2018-04-03 ENCOUNTER — TELEPHONE (OUTPATIENT)
Dept: INTERNAL MEDICINE CLINIC | Facility: CLINIC | Age: 43
End: 2018-04-03

## 2018-04-03 ENCOUNTER — LAB ENCOUNTER (OUTPATIENT)
Dept: LAB | Facility: HOSPITAL | Age: 43
End: 2018-04-03
Attending: SURGERY
Payer: COMMERCIAL

## 2018-04-03 DIAGNOSIS — R82.90 ABNORMAL URINALYSIS: Primary | ICD-10-CM

## 2018-04-03 DIAGNOSIS — Z01.818 PREOP TESTING: ICD-10-CM

## 2018-04-03 PROCEDURE — 36415 COLL VENOUS BLD VENIPUNCTURE: CPT

## 2018-04-03 PROCEDURE — 81001 URINALYSIS AUTO W/SCOPE: CPT | Performed by: INTERNAL MEDICINE

## 2018-04-03 PROCEDURE — 86901 BLOOD TYPING SEROLOGIC RH(D): CPT

## 2018-04-03 PROCEDURE — 86850 RBC ANTIBODY SCREEN: CPT

## 2018-04-03 PROCEDURE — 86900 BLOOD TYPING SEROLOGIC ABO: CPT

## 2018-04-03 PROCEDURE — 87641 MR-STAPH DNA AMP PROBE: CPT

## 2018-04-03 NOTE — TELEPHONE ENCOUNTER
nursing call them, I left this message on mychart, try to reiterate-  Ronald Austin, here are some of the lab work you did last week, it does show your urine is still abnormal, I have placed a order for a repeat urine hopefully you can do this in the next 2 days t

## 2018-04-05 ENCOUNTER — TELEPHONE (OUTPATIENT)
Dept: ADMINISTRATIVE | Age: 43
End: 2018-04-05

## 2018-04-05 ENCOUNTER — APPOINTMENT (OUTPATIENT)
Dept: LAB | Facility: HOSPITAL | Age: 43
End: 2018-04-05
Attending: INTERNAL MEDICINE
Payer: COMMERCIAL

## 2018-04-05 PROCEDURE — 81003 URINALYSIS AUTO W/O SCOPE: CPT | Performed by: INTERNAL MEDICINE

## 2018-04-05 NOTE — TELEPHONE ENCOUNTER
Pt [email or web address], had FMLA for spouse, but form needs to be completed by Dr. Meg Davalos, or Dr. Marcello Umanzor. Signed release shredded, fee refunded, no copy of the form taken. Call completed.  NK

## 2018-04-05 NOTE — TELEPHONE ENCOUNTER
Called patient who states she got a call yesterday - she will go today for repeat UA with reflex culture

## 2018-04-09 ENCOUNTER — SURGERY (OUTPATIENT)
Age: 43
End: 2018-04-09

## 2018-04-09 ENCOUNTER — ANESTHESIA EVENT (OUTPATIENT)
Dept: SURGERY | Facility: HOSPITAL | Age: 43
DRG: 585 | End: 2018-04-09
Payer: COMMERCIAL

## 2018-04-09 ENCOUNTER — TELEPHONE (OUTPATIENT)
Dept: HEMATOLOGY/ONCOLOGY | Facility: HOSPITAL | Age: 43
End: 2018-04-09

## 2018-04-09 ENCOUNTER — ANESTHESIA (OUTPATIENT)
Dept: SURGERY | Facility: HOSPITAL | Age: 43
DRG: 585 | End: 2018-04-09
Payer: COMMERCIAL

## 2018-04-09 ENCOUNTER — HOSPITAL ENCOUNTER (INPATIENT)
Facility: HOSPITAL | Age: 43
LOS: 3 days | Discharge: HOME | DRG: 585 | End: 2018-04-12
Attending: SURGERY | Admitting: SURGERY
Payer: COMMERCIAL

## 2018-04-09 DIAGNOSIS — Z01.818 PREOP TESTING: Primary | ICD-10-CM

## 2018-04-09 DIAGNOSIS — Z80.3 FAMILY HISTORY OF MALIGNANT NEOPLASM OF BREAST: ICD-10-CM

## 2018-04-09 PROBLEM — Z90.13 ABSENCE OF BREAST, ACQUIRED, BILATERAL: Status: ACTIVE | Noted: 2018-04-09

## 2018-04-09 PROCEDURE — 0HRT077 REPLACEMENT OF RIGHT BREAST USING DEEP INFERIOR EPIGASTRIC ARTERY PERFORATOR FLAP, OPEN APPROACH: ICD-10-PCS | Performed by: SURGERY

## 2018-04-09 PROCEDURE — 0HTV0ZZ RESECTION OF BILATERAL BREAST, OPEN APPROACH: ICD-10-PCS | Performed by: SURGERY

## 2018-04-09 PROCEDURE — 0HRU077 REPLACEMENT OF LEFT BREAST USING DEEP INFERIOR EPIGASTRIC ARTERY PERFORATOR FLAP, OPEN APPROACH: ICD-10-PCS | Performed by: SURGERY

## 2018-04-09 DEVICE — 3.0MM GOLD COUPLER: Type: IMPLANTABLE DEVICE | Site: BREAST | Status: FUNCTIONAL

## 2018-04-09 DEVICE — 2.5MM RED COUPLER: Type: IMPLANTABLE DEVICE | Site: BREAST | Status: FUNCTIONAL

## 2018-04-09 RX ORDER — HYDROMORPHONE HYDROCHLORIDE 1 MG/ML
0.4 INJECTION, SOLUTION INTRAMUSCULAR; INTRAVENOUS; SUBCUTANEOUS EVERY 5 MIN PRN
Status: DISCONTINUED | OUTPATIENT
Start: 2018-04-09 | End: 2018-04-09 | Stop reason: ALTCHOICE

## 2018-04-09 RX ORDER — SCOLOPAMINE TRANSDERMAL SYSTEM 1 MG/1
1 PATCH, EXTENDED RELEASE TRANSDERMAL ONCE
Status: COMPLETED | OUTPATIENT
Start: 2018-04-09 | End: 2018-04-12

## 2018-04-09 RX ORDER — ASPIRIN 300 MG
SUPPOSITORY, RECTAL RECTAL AS NEEDED
Status: DISCONTINUED | OUTPATIENT
Start: 2018-04-09 | End: 2018-04-09 | Stop reason: HOSPADM

## 2018-04-09 RX ORDER — METOCLOPRAMIDE 10 MG/1
10 TABLET ORAL ONCE
Status: DISCONTINUED | OUTPATIENT
Start: 2018-04-09 | End: 2018-04-09 | Stop reason: HOSPADM

## 2018-04-09 RX ORDER — SCOLOPAMINE TRANSDERMAL SYSTEM 1 MG/1
1 PATCH, EXTENDED RELEASE TRANSDERMAL
Status: DISCONTINUED | OUTPATIENT
Start: 2018-04-12 | End: 2018-04-12

## 2018-04-09 RX ORDER — CLINDAMYCIN PHOSPHATE 900 MG/50ML
900 INJECTION INTRAVENOUS EVERY 8 HOURS
Status: COMPLETED | OUTPATIENT
Start: 2018-04-10 | End: 2018-04-10

## 2018-04-09 RX ORDER — DOCUSATE SODIUM 100 MG/1
100 CAPSULE, LIQUID FILLED ORAL 2 TIMES DAILY
Status: DISCONTINUED | OUTPATIENT
Start: 2018-04-10 | End: 2018-04-12

## 2018-04-09 RX ORDER — DIPHENHYDRAMINE HYDROCHLORIDE 50 MG/ML
25 INJECTION INTRAMUSCULAR; INTRAVENOUS EVERY 4 HOURS PRN
Status: DISCONTINUED | OUTPATIENT
Start: 2018-04-09 | End: 2018-04-12

## 2018-04-09 RX ORDER — METHYLENE BLUE 10 MG/ML
INJECTION INTRAVENOUS AS NEEDED
Status: DISCONTINUED | OUTPATIENT
Start: 2018-04-09 | End: 2018-04-09 | Stop reason: HOSPADM

## 2018-04-09 RX ORDER — ENOXAPARIN SODIUM 100 MG/ML
40 INJECTION SUBCUTANEOUS DAILY
Status: DISCONTINUED | OUTPATIENT
Start: 2018-04-10 | End: 2018-04-12

## 2018-04-09 RX ORDER — MORPHINE SULFATE 4 MG/ML
4 INJECTION, SOLUTION INTRAMUSCULAR; INTRAVENOUS EVERY 10 MIN PRN
Status: DISCONTINUED | OUTPATIENT
Start: 2018-04-09 | End: 2018-04-09 | Stop reason: ALTCHOICE

## 2018-04-09 RX ORDER — ACETAMINOPHEN 500 MG
1000 TABLET ORAL ONCE
Status: COMPLETED | OUTPATIENT
Start: 2018-04-09 | End: 2018-04-09

## 2018-04-09 RX ORDER — GLYCOPYRROLATE 0.2 MG/ML
INJECTION INTRAMUSCULAR; INTRAVENOUS AS NEEDED
Status: DISCONTINUED | OUTPATIENT
Start: 2018-04-09 | End: 2018-04-09 | Stop reason: SURG

## 2018-04-09 RX ORDER — ONDANSETRON 2 MG/ML
8 INJECTION INTRAMUSCULAR; INTRAVENOUS EVERY 6 HOURS PRN
Status: DISCONTINUED | OUTPATIENT
Start: 2018-04-09 | End: 2018-04-12

## 2018-04-09 RX ORDER — HYDROCODONE BITARTRATE AND ACETAMINOPHEN 5; 325 MG/1; MG/1
1 TABLET ORAL AS NEEDED
Status: DISCONTINUED | OUTPATIENT
Start: 2018-04-09 | End: 2018-04-09 | Stop reason: ALTCHOICE

## 2018-04-09 RX ORDER — ONDANSETRON 2 MG/ML
4 INJECTION INTRAMUSCULAR; INTRAVENOUS ONCE AS NEEDED
Status: ACTIVE | OUTPATIENT
Start: 2018-04-09 | End: 2018-04-09

## 2018-04-09 RX ORDER — DOCUSATE SODIUM 100 MG/1
100 CAPSULE, LIQUID FILLED ORAL 2 TIMES DAILY
Qty: 60 CAPSULE | Refills: 0 | Status: SHIPPED | OUTPATIENT
Start: 2018-04-09 | End: 2018-11-06

## 2018-04-09 RX ORDER — BUPIVACAINE HYDROCHLORIDE 5 MG/ML
INJECTION, SOLUTION EPIDURAL; INTRACAUDAL AS NEEDED
Status: DISCONTINUED | OUTPATIENT
Start: 2018-04-09 | End: 2018-04-09 | Stop reason: HOSPADM

## 2018-04-09 RX ORDER — ONDANSETRON 2 MG/ML
INJECTION INTRAMUSCULAR; INTRAVENOUS AS NEEDED
Status: DISCONTINUED | OUTPATIENT
Start: 2018-04-09 | End: 2018-04-09 | Stop reason: SURG

## 2018-04-09 RX ORDER — HYDROCODONE BITARTRATE AND ACETAMINOPHEN 5; 325 MG/1; MG/1
1-2 TABLET ORAL EVERY 4 HOURS PRN
Status: DISCONTINUED | OUTPATIENT
Start: 2018-04-09 | End: 2018-04-12

## 2018-04-09 RX ORDER — DEXAMETHASONE SODIUM PHOSPHATE 4 MG/ML
VIAL (ML) INJECTION AS NEEDED
Status: DISCONTINUED | OUTPATIENT
Start: 2018-04-09 | End: 2018-04-09 | Stop reason: SURG

## 2018-04-09 RX ORDER — HYDROCODONE BITARTRATE AND ACETAMINOPHEN 5; 325 MG/1; MG/1
2 TABLET ORAL AS NEEDED
Status: DISCONTINUED | OUTPATIENT
Start: 2018-04-09 | End: 2018-04-09 | Stop reason: ALTCHOICE

## 2018-04-09 RX ORDER — ENOXAPARIN SODIUM 100 MG/ML
40 INJECTION SUBCUTANEOUS DAILY
Qty: 14 SYRINGE | Refills: 0 | Status: SHIPPED | OUTPATIENT
Start: 2018-04-09 | End: 2018-06-25

## 2018-04-09 RX ORDER — HEPARIN SODIUM 5000 [USP'U]/ML
INJECTION, SOLUTION INTRAVENOUS; SUBCUTANEOUS AS NEEDED
Status: DISCONTINUED | OUTPATIENT
Start: 2018-04-09 | End: 2018-04-09 | Stop reason: HOSPADM

## 2018-04-09 RX ORDER — FAMOTIDINE 20 MG/1
20 TABLET ORAL ONCE
Status: DISCONTINUED | OUTPATIENT
Start: 2018-04-09 | End: 2018-04-09 | Stop reason: HOSPADM

## 2018-04-09 RX ORDER — HALOPERIDOL 5 MG/ML
0.25 INJECTION INTRAMUSCULAR ONCE AS NEEDED
Status: ACTIVE | OUTPATIENT
Start: 2018-04-09 | End: 2018-04-09

## 2018-04-09 RX ORDER — HYDROCODONE BITARTRATE AND ACETAMINOPHEN 5; 325 MG/1; MG/1
1-2 TABLET ORAL EVERY 4 HOURS PRN
Qty: 40 TABLET | Refills: 0 | Status: SHIPPED | OUTPATIENT
Start: 2018-04-09 | End: 2018-06-25

## 2018-04-09 RX ORDER — ASPIRIN 81 MG/1
81 TABLET, CHEWABLE ORAL DAILY
Status: DISCONTINUED | OUTPATIENT
Start: 2018-04-10 | End: 2018-04-12

## 2018-04-09 RX ORDER — HYDROMORPHONE HYDROCHLORIDE 1 MG/ML
0.6 INJECTION, SOLUTION INTRAMUSCULAR; INTRAVENOUS; SUBCUTANEOUS EVERY 5 MIN PRN
Status: DISCONTINUED | OUTPATIENT
Start: 2018-04-09 | End: 2018-04-09 | Stop reason: ALTCHOICE

## 2018-04-09 RX ORDER — CLINDAMYCIN PHOSPHATE 600 MG/50ML
600 INJECTION INTRAVENOUS ONCE
Status: COMPLETED | OUTPATIENT
Start: 2018-04-09 | End: 2018-04-09

## 2018-04-09 RX ORDER — SODIUM CHLORIDE, SODIUM LACTATE, POTASSIUM CHLORIDE, CALCIUM CHLORIDE 600; 310; 30; 20 MG/100ML; MG/100ML; MG/100ML; MG/100ML
INJECTION, SOLUTION INTRAVENOUS CONTINUOUS
Status: DISCONTINUED | OUTPATIENT
Start: 2018-04-09 | End: 2018-04-12

## 2018-04-09 RX ORDER — 0.9 % SODIUM CHLORIDE 0.9 %
VIAL (ML) INJECTION
Status: DISPENSED
Start: 2018-04-09 | End: 2018-04-10

## 2018-04-09 RX ORDER — SODIUM CHLORIDE, SODIUM LACTATE, POTASSIUM CHLORIDE, CALCIUM CHLORIDE 600; 310; 30; 20 MG/100ML; MG/100ML; MG/100ML; MG/100ML
INJECTION, SOLUTION INTRAVENOUS CONTINUOUS
Status: DISCONTINUED | OUTPATIENT
Start: 2018-04-09 | End: 2018-04-09

## 2018-04-09 RX ORDER — ONDANSETRON 4 MG/1
4 TABLET, FILM COATED ORAL EVERY 8 HOURS PRN
Qty: 20 TABLET | Refills: 1 | Status: SHIPPED | OUTPATIENT
Start: 2018-04-09 | End: 2018-06-25

## 2018-04-09 RX ORDER — MIDAZOLAM HYDROCHLORIDE 1 MG/ML
INJECTION INTRAMUSCULAR; INTRAVENOUS AS NEEDED
Status: DISCONTINUED | OUTPATIENT
Start: 2018-04-09 | End: 2018-04-09 | Stop reason: SURG

## 2018-04-09 RX ORDER — MORPHINE SULFATE 4 MG/ML
1 INJECTION, SOLUTION INTRAMUSCULAR; INTRAVENOUS EVERY 2 HOUR PRN
Status: DISCONTINUED | OUTPATIENT
Start: 2018-04-09 | End: 2018-04-12

## 2018-04-09 RX ORDER — NEOSTIGMINE METHYLSULFATE 0.5 MG/ML
INJECTION INTRAVENOUS AS NEEDED
Status: DISCONTINUED | OUTPATIENT
Start: 2018-04-09 | End: 2018-04-09 | Stop reason: SURG

## 2018-04-09 RX ORDER — NALOXONE HYDROCHLORIDE 0.4 MG/ML
80 INJECTION, SOLUTION INTRAMUSCULAR; INTRAVENOUS; SUBCUTANEOUS AS NEEDED
Status: ACTIVE | OUTPATIENT
Start: 2018-04-09 | End: 2018-04-09

## 2018-04-09 RX ORDER — MORPHINE SULFATE 4 MG/ML
2 INJECTION, SOLUTION INTRAMUSCULAR; INTRAVENOUS EVERY 10 MIN PRN
Status: DISCONTINUED | OUTPATIENT
Start: 2018-04-09 | End: 2018-04-09 | Stop reason: ALTCHOICE

## 2018-04-09 RX ORDER — ROCURONIUM BROMIDE 10 MG/ML
INJECTION, SOLUTION INTRAVENOUS AS NEEDED
Status: DISCONTINUED | OUTPATIENT
Start: 2018-04-09 | End: 2018-04-09 | Stop reason: SURG

## 2018-04-09 RX ORDER — MORPHINE SULFATE 10 MG/ML
6 INJECTION, SOLUTION INTRAMUSCULAR; INTRAVENOUS EVERY 10 MIN PRN
Status: DISCONTINUED | OUTPATIENT
Start: 2018-04-09 | End: 2018-04-09 | Stop reason: ALTCHOICE

## 2018-04-09 RX ORDER — HYDROMORPHONE HYDROCHLORIDE 1 MG/ML
INJECTION, SOLUTION INTRAMUSCULAR; INTRAVENOUS; SUBCUTANEOUS AS NEEDED
Status: DISCONTINUED | OUTPATIENT
Start: 2018-04-09 | End: 2018-04-09 | Stop reason: SURG

## 2018-04-09 RX ORDER — HYDROMORPHONE HYDROCHLORIDE 1 MG/ML
0.2 INJECTION, SOLUTION INTRAMUSCULAR; INTRAVENOUS; SUBCUTANEOUS EVERY 5 MIN PRN
Status: DISCONTINUED | OUTPATIENT
Start: 2018-04-09 | End: 2018-04-09 | Stop reason: ALTCHOICE

## 2018-04-09 RX ORDER — LIDOCAINE HYDROCHLORIDE 10 MG/ML
INJECTION, SOLUTION EPIDURAL; INFILTRATION; INTRACAUDAL; PERINEURAL AS NEEDED
Status: DISCONTINUED | OUTPATIENT
Start: 2018-04-09 | End: 2018-04-09 | Stop reason: SURG

## 2018-04-09 RX ORDER — METOCLOPRAMIDE HYDROCHLORIDE 5 MG/ML
10 INJECTION INTRAMUSCULAR; INTRAVENOUS EVERY 6 HOURS PRN
Status: DISCONTINUED | OUTPATIENT
Start: 2018-04-09 | End: 2018-04-12

## 2018-04-09 RX ORDER — SODIUM CHLORIDE, SODIUM LACTATE, POTASSIUM CHLORIDE, CALCIUM CHLORIDE 600; 310; 30; 20 MG/100ML; MG/100ML; MG/100ML; MG/100ML
INJECTION, SOLUTION INTRAVENOUS CONTINUOUS
Status: DISCONTINUED | OUTPATIENT
Start: 2018-04-09 | End: 2018-04-09 | Stop reason: ALTCHOICE

## 2018-04-09 RX ADMIN — ONDANSETRON 4 MG: 2 INJECTION INTRAMUSCULAR; INTRAVENOUS at 15:33:00

## 2018-04-09 RX ADMIN — SODIUM CHLORIDE, SODIUM LACTATE, POTASSIUM CHLORIDE, CALCIUM CHLORIDE: 600; 310; 30; 20 INJECTION, SOLUTION INTRAVENOUS at 10:10:00

## 2018-04-09 RX ADMIN — ROCURONIUM BROMIDE 10 MG: 10 INJECTION, SOLUTION INTRAVENOUS at 12:57:00

## 2018-04-09 RX ADMIN — CLINDAMYCIN PHOSPHATE 600 MG: 600 INJECTION INTRAVENOUS at 15:50:00

## 2018-04-09 RX ADMIN — ROCURONIUM BROMIDE 10 MG: 10 INJECTION, SOLUTION INTRAVENOUS at 11:38:00

## 2018-04-09 RX ADMIN — SODIUM CHLORIDE, SODIUM LACTATE, POTASSIUM CHLORIDE, CALCIUM CHLORIDE: 600; 310; 30; 20 INJECTION, SOLUTION INTRAVENOUS at 13:22:00

## 2018-04-09 RX ADMIN — ROCURONIUM BROMIDE 20 MG: 10 INJECTION, SOLUTION INTRAVENOUS at 10:41:00

## 2018-04-09 RX ADMIN — CLINDAMYCIN PHOSPHATE 600 MG: 600 INJECTION INTRAVENOUS at 07:49:00

## 2018-04-09 RX ADMIN — SODIUM CHLORIDE, SODIUM LACTATE, POTASSIUM CHLORIDE, CALCIUM CHLORIDE: 600; 310; 30; 20 INJECTION, SOLUTION INTRAVENOUS at 16:21:00

## 2018-04-09 RX ADMIN — SODIUM CHLORIDE, SODIUM LACTATE, POTASSIUM CHLORIDE, CALCIUM CHLORIDE: 600; 310; 30; 20 INJECTION, SOLUTION INTRAVENOUS at 07:28:00

## 2018-04-09 RX ADMIN — ONDANSETRON 4 MG: 2 INJECTION INTRAMUSCULAR; INTRAVENOUS at 14:59:00

## 2018-04-09 RX ADMIN — LIDOCAINE HYDROCHLORIDE 50 MG: 10 INJECTION, SOLUTION EPIDURAL; INFILTRATION; INTRACAUDAL; PERINEURAL at 07:31:00

## 2018-04-09 RX ADMIN — HYDROMORPHONE HYDROCHLORIDE 0.5 MG: 1 INJECTION, SOLUTION INTRAMUSCULAR; INTRAVENOUS; SUBCUTANEOUS at 08:28:00

## 2018-04-09 RX ADMIN — GLYCOPYRROLATE 0.2 MG: 0.2 INJECTION INTRAMUSCULAR; INTRAVENOUS at 15:29:00

## 2018-04-09 RX ADMIN — HYDROMORPHONE HYDROCHLORIDE 0.5 MG: 1 INJECTION, SOLUTION INTRAMUSCULAR; INTRAVENOUS; SUBCUTANEOUS at 15:09:00

## 2018-04-09 RX ADMIN — GLYCOPYRROLATE 0.2 MG: 0.2 INJECTION INTRAMUSCULAR; INTRAVENOUS at 15:13:00

## 2018-04-09 RX ADMIN — ROCURONIUM BROMIDE 10 MG: 10 INJECTION, SOLUTION INTRAVENOUS at 08:32:00

## 2018-04-09 RX ADMIN — NEOSTIGMINE METHYLSULFATE 2.5 MG: 0.5 INJECTION INTRAVENOUS at 15:13:00

## 2018-04-09 RX ADMIN — ROCURONIUM BROMIDE 50 MG: 10 INJECTION, SOLUTION INTRAVENOUS at 07:31:00

## 2018-04-09 RX ADMIN — DEXAMETHASONE SODIUM PHOSPHATE 8 MG: 4 MG/ML VIAL (ML) INJECTION at 07:48:00

## 2018-04-09 RX ADMIN — MIDAZOLAM HYDROCHLORIDE 2 MG: 1 INJECTION INTRAMUSCULAR; INTRAVENOUS at 07:26:00

## 2018-04-09 NOTE — BRIEF OP NOTE
Pre-Operative Diagnosis: breast cancer susceptibility gene     Post-Operative Diagnosis: breast cancer susceptibility gene      Procedure Performed:   Procedure(s):  bilateral SKIN SPARING sparing mastectomies      Surgeon(s) and Role:  Panel 1:     * Faye

## 2018-04-09 NOTE — BRIEF OP NOTE
Pre-Operative Diagnosis: breast cancer susceptibility gene     Post-Operative Diagnosis: breast cancer susceptibility gene      Procedure Performed:   Procedure(s):  bilateral nipple sparing mastectomies, bilateral deep inferior epigastric  arter

## 2018-04-09 NOTE — ANESTHESIA POSTPROCEDURE EVALUATION
Patient: Saima De Souza    Procedure Summary     Date:  04/09/18 Room / Location:  [address] [place] [address] MAIN OR 02 / [address] [place] [address] MAIN OR    Anesthesia Start:  0702 Anesthesia Stop:  8925    Procedures:       BREAST MODIFIED RADICAL MASTECTOMY (Bilateral )      MICRO FLAP (Bilatera

## 2018-04-09 NOTE — H&P
History of Present Illness: The patient is a 44year old female with history of BRCA genetic mutation who now presents for bilateral prophylactic mastectomy and immediate reconstruction with abdominal free flap.     Past Medical History:      Past Medical • Lipids Father    • Hypertension Father    • Breast Cancer Mother 48     also 58; BRCA1 +   • Diabetes Mother    • Hypertension Mother    • Obesity Mother    • Cancer Mother    • Ovarian Cancer Maternal Grandmother 50     d. 50   • Cancer Maternal Grandm heartbeat, high blood pressure, stroke, or leg swelling    Breasts:  Patient denies breast masses, pain, change in the breast skin, skin dimpling, nipple discharge, or rash    Gastrointestinal:   There is no history of difficulty or pain with swallowing, r female who appears her stated age. Her speech patterns and movements are normal, and affect is appropriate.     HEENT: The head is normocephalic. The neck is supple. The thyroid is not enlarged and is without palpable masses.   The trachea is in the midline

## 2018-04-09 NOTE — H&P
History of Present Illness:   Ms. Marcelo Cox is a 40year old woman who presented with a strong family history of breast and ovarian cancer and a confirmed BRCA mutation. She is now 3 months postpartum and without focal symptoms in her breasts.  She did breakfast. Disp:  Rfl:    PRENATAL 27-0.8 MG Oral Tab Take 1 tablet by mouth daily. Disp:  Rfl:    Calcium Carbonate Antacid (TUMS CHEWY DELIGHTS) 1177 MG Oral Chew Tab   Disp:  Rfl:    Cyanocobalamin (B-12) 100 MCG Oral Tab Take by mouth.  Disp:  Rfl:    C glaucoma, yellowing of the eyes, change in vision or color blindness. The patient denies hearing loss, ringing in the ears, ear drainage, earaches, nasal congestion, nose bleeds, snoring, pain in mouth/throat, hoarseness, change in voice, facial trauma.     hands/feet. There is no history of abusive relationship, bipolar disorder, sleep disturbance, anxiety, depression or feeling of despair.     Endocrine:     There is no history of poor/slow wound healing, weight loss/gain, fertility or hormone problems, cold masses.     Lymph Nodes: The supraclavicular, axillary and cervical regions are free of significant lymphadenopathy.     Back: There is no vertebral column tenderness.     Skin: The skin appears normal. There are no suspicious appearing rashes or lesions. premenopausal women) every 6 months starting at age 27 years or 5-10 years before the earliest age of first diagnosis of ovarian cancer in the family.    7. Consider chemopreventative strategies for breast and ovarian cancer such as tamoxifen, raloxifene, e

## 2018-04-09 NOTE — ANESTHESIA PREPROCEDURE EVALUATION
Anesthesia PreOp Note    HPI:     Beverly Quevedo is a 44year old female who presents for preoperative consultation requested by: Concetta Rao MD    Date of Surgery: 4/9/2018    Procedure(s):  BREAST MODIFIED RADICAL MASTECTOMY  MICRO FLAP  MICRO FLA mouth daily. Disp:  Rfl:  Past Week at Unknown time   Calcium Carbonate Antacid (TUMS CHEWY DELIGHTS) 1177 MG Oral Chew Tab  Disp:  Rfl:  Past Week at Unknown time   Cyanocobalamin (B-12) 100 MCG Oral Tab Take by mouth daily.    Disp:  Rfl:  Past Week at [facility] Paternal Grandfather    • Diabetes Paternal Grandfather      Paternal Grandfather did not have diabetes   • Hypertension Paternal Grandfather    • Heart Disorder Paternal Grandfather    • Lipids Paternal Grandfather    • Other [OTHER] Paternal Grandfather Physical Exam     Patient summary reviewed and Nursing notes reviewed    Airway   Mallampati: II  TM distance: >3 FB  Neck ROM: full  Dental      Pulmonary - negative ROS and normal exam   Cardiovascular - negative ROS and normal exam    Neuro/Psych      G

## 2018-04-10 ENCOUNTER — SOCIAL WORK SERVICES (OUTPATIENT)
Dept: HEMATOLOGY/ONCOLOGY | Facility: HOSPITAL | Age: 43
End: 2018-04-10

## 2018-04-10 PROCEDURE — 99254 IP/OBS CNSLTJ NEW/EST MOD 60: CPT | Performed by: INTERNAL MEDICINE

## 2018-04-10 PROCEDURE — 99233 SBSQ HOSP IP/OBS HIGH 50: CPT | Performed by: HOSPITALIST

## 2018-04-10 RX ORDER — ALPRAZOLAM 0.25 MG/1
0.25 TABLET ORAL 3 TIMES DAILY PRN
Status: DISCONTINUED | OUTPATIENT
Start: 2018-04-10 | End: 2018-04-12

## 2018-04-10 RX ORDER — MELATONIN
325
Status: DISCONTINUED | OUTPATIENT
Start: 2018-04-11 | End: 2018-04-12

## 2018-04-10 RX ORDER — SENNOSIDES 8.6 MG
8.6 TABLET ORAL 2 TIMES DAILY
Status: DISCONTINUED | OUTPATIENT
Start: 2018-04-10 | End: 2018-04-12

## 2018-04-10 RX ORDER — OMEGA-3S/DHA/EPA/FISH OIL/D3 300MG-1000
400 CAPSULE ORAL DAILY
Status: DISCONTINUED | OUTPATIENT
Start: 2018-04-10 | End: 2018-04-12

## 2018-04-10 RX ORDER — LORAZEPAM 2 MG/ML
INJECTION INTRAMUSCULAR
Status: DISPENSED
Start: 2018-04-10 | End: 2018-04-10

## 2018-04-10 RX ORDER — UBIDECARENONE 75 MG
100 CAPSULE ORAL DAILY
Status: DISCONTINUED | OUTPATIENT
Start: 2018-04-10 | End: 2018-04-10

## 2018-04-10 RX ORDER — CLOBETASOL PROPIONATE 0.46 MG/ML
SOLUTION TOPICAL 2 TIMES DAILY
Status: DISCONTINUED | OUTPATIENT
Start: 2018-04-10 | End: 2018-04-12

## 2018-04-10 RX ORDER — CHOLECALCIFEROL (VITAMIN D3) 125 MCG
1000 CAPSULE ORAL DAILY
Status: DISCONTINUED | OUTPATIENT
Start: 2018-04-10 | End: 2018-04-12

## 2018-04-10 NOTE — OPERATIVE REPORT
[facility]    PATIENT'S NAME: [email or web address], [[email or web address]] D   ATTENDING PHYSICIAN: Joel Dumont MD   OPERATING PHYSICIAN: Lisa Hawthorne MD   PATIENT ACCOUNT#:   [id number]    LOCATION:  [address] [place] [address] [place] #:   [id number]       DATE OF BIRTH:  [birth year] abdominal incision was made using a 10 blade, and then the flap was split in the midline and a periumbilical incision was made. Then the superior incision was made as well. Electrocautery was used to dissect through the subcutaneous tissue.   The left flori soaked on a Ray-Messi was placed. After preparation of the recipient vessels, abdominal free flap then was transferred to the left chest.  Medium clips were applied, and then the flap was disconnected.   The flap was irrigated and flushed with heparinized sa Vicryl sutures, and then all incisions were closed with 4-0 Monocryl subcuticular suture. This was done over a 15 round RISHI suction drain as well. Doppler signal was marked, and the ViOptix probe was reading 99%.   The patient tolerated the procedure well

## 2018-04-10 NOTE — PROGRESS NOTES
Pain controlled  AVSS  Hgb 10.5  Awake and alert  Flaps warm, soft, +doppler, nl cap refill  Abdominal incisions c/d/i  Umbilicus viable  Drain effluent serosanguinous  A/P:  POD #1  Flaps well-perfused  Continue monitoring  Advance diet  OOB to chair  Lov

## 2018-04-10 NOTE — PLAN OF CARE
Problem: Patient Centered Care  Goal: Patient preferences are identified and integrated in the patient's plan of care  Interventions:  - What would you like us to know as we care for you?   - Provide timely, complete, and accurate information to patient/fa and evaluate response  - Consider cultural and social influences on pain and pain management  - Manage/alleviate anxiety  - Utilize distraction and/or relaxation techniques  - Monitor for opioid side effects  - Notify MD/LIP if interventions unsuccessful o

## 2018-04-10 NOTE — CONSULTS
[facility]    Report of Consultation    Beverly Quevedo Patient Status:  Inpatient    1975 MRN [id number]   Location [facility] 2W/SW Attending Fidel Ward MD   [facility] Day # 1 PCP Emmie Jordan,      Date of Admissi Grandfather      kidney failure   • Cancer Other    • Diabetes Other      paternal   • Arthritis Other    • Heart Disease Other      family h/o   • Heart Disease Paternal Grandfather    • High Cholesterol Paternal Grandfather    • Diabetes Paternal [place] heparin 50,000 units in 100 mL NS irrigation 50,000 Units Irrigation Once       Prescriptions Prior to Admission:  ALPRAZolam (XANAX) 0.25 MG Oral Tab Take 1 tablet (0.25 mg total) by mouth 3 (three) times daily as needed for Sleep.    Clobetasol Propionate no gross motor deficits  Skin: warm, dry    Results:   Laboratory Data    Lab Results  Component Value Date   WBC 9.0 04/10/2018   HGB 10.5 (L) 04/10/2018   HCT 31.1 (L) 04/10/2018    04/10/2018   CREATSERUM 0.82 04/10/2018   BUN 10 04/10/2018   NA

## 2018-04-10 NOTE — PROGRESS NOTES
[place] [facility]    Progress Note    Saima De Souza Patient Status:  Inpatient    1975 MRN [id number]   Location [facility] 2W/SW Attending Tatum Bailon MD   [facility] Day # 1 PCP Criss Abernathy DO       Subjective:   Pablo Cruz 500mL   Irrigation Once   • [MAR Hold] heparin 5,000 units in 1L of LR  50,000 Units Irrigation Once           morphINE sulfate (PF), HYDROcodone-acetaminophen, DiphenhydrAMINE HCl, ondansetron HCl, Metoclopramide HCl    Assessment and Plan:     Absence of

## 2018-04-10 NOTE — OPERATIVE REPORT
[facility]    PATIENT'S NAME: Forrest De Jesus   ATTENDING PHYSICIAN: Maximiliano Mendez MD   OPERATING PHYSICIAN: Amrit Reveles MD   PATIENT ACCOUNT#:   [id number]    LOCATION:  [address] [place] [address] [place] #:   [id number]       DATE OF BIRTH:  3 previous low transverse scar. The patient was then taken to the outpatient operating room, properly identified, placed in the supine position. Sequential compression devices were placed on bilateral lower extremities.   Intravenous antibiotic prophylaxis was incised with tenotomy scissors. The perforators were dissected from the enveloping musculature with bipolar cautery. Larger branches were clipped and divided.   The dissection proceeded through a long intramuscular course where the takeoff of the late arterial control were released. The anastomoses were noted to be hemostatic, and the flap was noted to have normal perfusion.   The flap was then placed in the chest wall pocket and secured with 2 interrupted 2-0 Vicryl sutures between the Ilda fascia an with interrupted 3-0 Vicryl deep dermal suture and running 4 Monocryl subcuticular suture. Biopatch and Tegaderm were placed on all the drain sites. Dermabond was placed on all the incisions. ViOptix probes were placed which showed adequate readings.   Steffanie Caruso

## 2018-04-10 NOTE — PAYOR COMM NOTE
OPERATION DATE:  04/09/2018     OPERATIVE REPORT        PREOPERATIVE DIAGNOSIS:  BRCA1 genetic carrier. POSTOPERATIVE DIAGNOSIS:  BRCA1 genetic carrier. PROCEDURE PERFORMED:  Bilateral prophylactic skin sparing mastectomies.     OPERATION DATE:  04/09/201

## 2018-04-11 PROCEDURE — 99232 SBSQ HOSP IP/OBS MODERATE 35: CPT | Performed by: INTERNAL MEDICINE

## 2018-04-11 PROCEDURE — 99232 SBSQ HOSP IP/OBS MODERATE 35: CPT | Performed by: HOSPITALIST

## 2018-04-11 RX ORDER — 0.9 % SODIUM CHLORIDE 0.9 %
VIAL (ML) INJECTION
Status: COMPLETED
Start: 2018-04-11 | End: 2018-04-12

## 2018-04-11 RX ORDER — POTASSIUM CHLORIDE 20 MEQ/1
40 TABLET, EXTENDED RELEASE ORAL EVERY 4 HOURS
Status: COMPLETED | OUTPATIENT
Start: 2018-04-11 | End: 2018-04-11

## 2018-04-11 RX ORDER — PANTOPRAZOLE SODIUM 40 MG/1
40 TABLET, DELAYED RELEASE ORAL
Status: DISCONTINUED | OUTPATIENT
Start: 2018-04-12 | End: 2018-04-12

## 2018-04-11 NOTE — PROGRESS NOTES
[facility]     Progress Note        Dearl Dmitriy Patient Status:  Inpatient    1975 MRN [id number]   Location [facility] 2W/SW Attending Michael Gu MD   [facility] Day # 2 PCP Ferdie Murdoch D'[place], DO       Subjective:   Pa DiphenhydrAMINE HCl (BENADRYL) injection 25 mg 25 mg Intravenous Q4H PRN   ondansetron HCl (ZOFRAN) injection 8 mg 8 mg Intravenous Q6H PRN   Metoclopramide HCl (REGLAN) injection 10 mg 10 mg Intravenous Q6H PRN   Enoxaparin Sodium (LOVENOX) 40 MG/0.4ML

## 2018-04-11 NOTE — PROGRESS NOTES
[place] [facility]  Progress Note     Sherley Chamorro  : 1975    Status: Inpatient  Day #: 2    Attending: Jose Jimenez MD  PCP: Dewitte Floss D'Amico,       Assessment and Plan     Absence of breast, acquired, bilateral  - Status post b PLT  187  187   RBC  3.64*  3.12*   MCV  85.6  86.0   MCH  28.7  29.5   MCHC  33.6  34.3   RDW  14.2  14.1     Recent Labs   Lab  04/10/18   0454  04/11/18   0502   BUN  10  9   CREATSERUM  0.82  0.93   GFRAA  >60  >60   GFRNAA  >60  >60   CA  8.4*  8.1*

## 2018-04-11 NOTE — PLAN OF CARE
Problem: SKIN/TISSUE INTEGRITY - ADULT  Goal: Incision(s), wounds(s) or drain site(s) healing without S/S of infection  INTERVENTIONS:  - Assess and document risk factors for pressure ulcer development  - Assess and document skin integrity  - Assess and do for assistance with activity based on assessment  - Modify environment to reduce risk of injury  - Provide assistive devices as appropriate  - Consider OT/PT consult to assist with strengthening/mobility  - Encourage toileting schedule  Outcome: Progressin

## 2018-04-11 NOTE — PLAN OF CARE
Problem: Patient Centered Care  Goal: Patient preferences are identified and integrated in the patient's plan of care  Interventions:  - What would you like us to know as we care for you?  My mom was cared for post breast flap for + Breast cancer recently a Outcome: Progressing      Problem: PAIN - ADULT  Goal: Verbalizes/displays adequate comfort level or patient's stated pain goal  INTERVENTIONS:  - Encourage pt to monitor pain and request assistance  - Assess pain using appropriate pain scale  - Administ consult as needed  - Establish a toileting routine/schedule  - Consider collaborating with pharmacy to review patient's medication profile   Outcome: Progressing  Patient passing gas. Up to bathroom several times after oglesby discontinued, but no bm.     Pro

## 2018-04-11 NOTE — PROGRESS NOTES
Pain controlled with PCA. OOB to chair.  Tolerating PO   AVSS  Awake and alert  Flaps warm, soft, +doppler, nl cap refill  Abdominal incisions c/d/i  Umbilicus viable  Drain effluent serosanguinous  A/P:  POD #2  Flaps well-perfused  Continue monitoring  Pl

## 2018-04-11 NOTE — CM/SW NOTE
SW met with the pt for initial assessment and dc planning. Address and phone confirmed as listed on the facesheet. Pt lives with  and 2 kids in a single family home. Prior to admission, pt is ambulatory without device, no breathing equipment.  Pt is

## 2018-04-12 VITALS
TEMPERATURE: 98 F | WEIGHT: 131.19 LBS | RESPIRATION RATE: 18 BRPM | HEART RATE: 94 BPM | HEIGHT: 60 IN | SYSTOLIC BLOOD PRESSURE: 116 MMHG | OXYGEN SATURATION: 97 % | BODY MASS INDEX: 25.76 KG/M2 | DIASTOLIC BLOOD PRESSURE: 75 MMHG

## 2018-04-12 RX ORDER — 0.9 % SODIUM CHLORIDE 0.9 %
VIAL (ML) INJECTION
Status: DISCONTINUED
Start: 2018-04-12 | End: 2018-04-12

## 2018-04-12 NOTE — DISCHARGE PLANNING
Pt being discharged at this time.  Pt emptied drain sites correctly and independently, ambulating in room, discharge summary explained and given to patient, follow up apt in epic and in summary, prescriptions given to patient and , VSS, pain controll

## 2018-04-12 NOTE — PLAN OF CARE
Problem: SKIN/TISSUE INTEGRITY - ADULT  Goal: Incision(s), wounds(s) or drain site(s) healing without S/S of infection  INTERVENTIONS:  - Assess and document risk factors for pressure ulcer development  - Assess and document skin integrity  - Assess and do PCA  - See additional Care Plan goals for specific interventions    Outcome: Progressing  Pain controlled with prn medications.      Problem: GASTROINTESTINAL - ADULT  Goal: Maintains or returns to baseline bowel function  INTERVENTIONS:  - Assess bowel fun

## 2018-04-12 NOTE — PLAN OF CARE
Problem: SKIN/TISSUE INTEGRITY - ADULT  Goal: Incision(s), wounds(s) or drain site(s) healing without S/S of infection  INTERVENTIONS:  - Assess and document risk factors for pressure ulcer development  - Assess and document skin integrity  - Assess and do interventions    Outcome: Progressing  Possible DC today, or tomorrow. PCA needs to be discontinued. No post OP BM yet, patient passing gas. No complications w/ flaps.          Ainsley Payan RN BSN

## 2018-04-12 NOTE — PROGRESS NOTES
Pain controlled. Ambulating.   AVSS  Hgb 8  Awake and alert  Flaps warm, soft, +doppler, nl cap refill  Abdominal incisions c/d/i  Umbilicus viable  Drain effluent serosanguinous  A/P:  POD #3  Flaps well-perfused  Continue monitoring  Anemia likely dilutio

## 2018-04-12 NOTE — PROGRESS NOTES
Patient discharged earlier this AM. I was not informed of patient being discharged and was not able to see the patient due to this.

## 2018-04-13 ENCOUNTER — TELEPHONE (OUTPATIENT)
Dept: SURGERY | Facility: CLINIC | Age: 43
End: 2018-04-13

## 2018-04-13 ENCOUNTER — TELEPHONE (OUTPATIENT)
Dept: INTERNAL MEDICINE CLINIC | Facility: CLINIC | Age: 43
End: 2018-04-13

## 2018-04-13 NOTE — TELEPHONE ENCOUNTER
Pt calling to report a small indentation on left breast s/p VINICIO flap on 4/9. Pt reports no vomiting, fever, chills, or diarrhea. Pt sent picture via email and DR. Jairo Flores noted this was normal. Swelling is decreasing in the breast and thus an indentation h

## 2018-04-13 NOTE — TELEPHONE ENCOUNTER
Pt discharged from [facility] AND [facility] on 4/12/18 . Please call to schedule follow up with Primary Care Physician.    Thanks

## 2018-04-16 ENCOUNTER — OFFICE VISIT (OUTPATIENT)
Dept: INTERNAL MEDICINE CLINIC | Facility: CLINIC | Age: 43
End: 2018-04-16

## 2018-04-16 VITALS
DIASTOLIC BLOOD PRESSURE: 62 MMHG | SYSTOLIC BLOOD PRESSURE: 100 MMHG | HEIGHT: 60 IN | TEMPERATURE: 99 F | BODY MASS INDEX: 25.72 KG/M2 | HEART RATE: 73 BPM | OXYGEN SATURATION: 98 % | WEIGHT: 131 LBS

## 2018-04-16 DIAGNOSIS — D64.9 ANEMIA, UNSPECIFIED TYPE: ICD-10-CM

## 2018-04-16 DIAGNOSIS — Z84.81 FHX: BRCA1 GENE POSITIVE: Primary | ICD-10-CM

## 2018-04-16 DIAGNOSIS — Z80.41 FAMILY HISTORY OF MALIGNANT NEOPLASM OF OVARY: ICD-10-CM

## 2018-04-16 DIAGNOSIS — Z80.3 FAMILY HISTORY OF MALIGNANT NEOPLASM OF BREAST: ICD-10-CM

## 2018-04-16 PROCEDURE — 99212 OFFICE O/P EST SF 10 MIN: CPT | Performed by: INTERNAL MEDICINE

## 2018-04-16 PROCEDURE — 1111F DSCHRG MED/CURRENT MED MERGE: CPT | Performed by: INTERNAL MEDICINE

## 2018-04-16 PROCEDURE — 99214 OFFICE O/P EST MOD 30 MIN: CPT | Performed by: INTERNAL MEDICINE

## 2018-04-16 NOTE — DISCHARGE SUMMARY
[place] [facility] - [facility]  Discharge Summary     Raul Ling  : 1975    Status: Inpatient  Day #: 3    Attending: Andrez Adams MD  PCP: Tawanda Salazar DO     Date of Admission: 2018  Date of Discharge: 2018     Hospital Disch Quantity:  14 Syringe  Refills:  0     HYDROcodone-acetaminophen 5-325 MG Tabs  Commonly known as:  NORCO      Take 1-2 tablets by mouth every 4 (four) hours as needed for Pain.    Quantity:  40 tablet  Refills:  0     Ondansetron HCl 4 mg tablet  Commonly High Risk  29-58 Medium Risk  0-28   Low Risk. TCM Follow-Up Recommendation:  LACE < 29: Low Risk of readmission after discharge from the hospital. No TCM follow-up needed.         Lisa Dixon MD

## 2018-04-16 NOTE — PROGRESS NOTES
HPI:   Jerod Lomax is a 44year old female who presents for complains of: Patient presents with:  Hospital F/U: Bilateral mastectomy with reconstruction, stable and doing well, no complications, pain controlled.  mild bleeding from one of the L incision management    4. Anemia, unspecified type  Labs at the end of next week, I will call with results  - CBC WITH DIFFERENTIAL WITH PLATELET; Future  - COMP METABOLIC PANEL (14);  Future  - FERRITIN; Future        Judit Mendez DO  4/16/2018  6:16 PM

## 2018-04-16 NOTE — PATIENT INSTRUCTIONS
1. FHx: BRCA1 gene positive  Cont with specialist followup    2. Family history of malignant neoplasm of breast  Cont with management    3. Family history of malignant neoplasm of ovary  Cont with management    4.  Anemia, unspecified type  Labs at the end

## 2018-04-20 ENCOUNTER — OFFICE VISIT (OUTPATIENT)
Dept: SURGERY | Facility: CLINIC | Age: 43
End: 2018-04-20

## 2018-04-20 DIAGNOSIS — Z80.3 FAMILY HISTORY OF MALIGNANT NEOPLASM OF BREAST: Primary | ICD-10-CM

## 2018-04-20 PROCEDURE — 99024 POSTOP FOLLOW-UP VISIT: CPT | Performed by: SURGERY

## 2018-04-20 NOTE — PROGRESS NOTES
Jerod Lomax is a 44year old female who presents today for a follow-up. She denies fever and chills. She denies nausea, vomiting, diarrhea or constipation. Physical Examination:  Breasts: Bilateral breast flaps are warm and soft.   Incisions are

## 2018-04-23 ENCOUNTER — TELEPHONE (OUTPATIENT)
Dept: SURGERY | Facility: CLINIC | Age: 43
End: 2018-04-23

## 2018-04-23 NOTE — TELEPHONE ENCOUNTER
The patient called with questions regarding future use of Lovenox injections and Ibuprofen dosing. Per Dr. Carrol Bamberger, the patient is to use the remaining four Lovenox injections as directed to complete therapy in its entirety.  The patient expresses dislike fo

## 2018-04-27 ENCOUNTER — OFFICE VISIT (OUTPATIENT)
Dept: SURGERY | Facility: CLINIC | Age: 43
End: 2018-04-27

## 2018-04-27 VITALS
HEART RATE: 51 BPM | TEMPERATURE: 98 F | BODY MASS INDEX: 25.72 KG/M2 | OXYGEN SATURATION: 98 % | SYSTOLIC BLOOD PRESSURE: 110 MMHG | DIASTOLIC BLOOD PRESSURE: 74 MMHG | HEIGHT: 60 IN | WEIGHT: 131 LBS

## 2018-04-27 DIAGNOSIS — Z90.13 ABSENCE OF BREAST, ACQUIRED, BILATERAL: Primary | ICD-10-CM

## 2018-04-27 PROCEDURE — 99024 POSTOP FOLLOW-UP VISIT: CPT | Performed by: SURGERY

## 2018-04-27 NOTE — PROGRESS NOTES
Glenys Serna is a 44year old female who presents today for a follow-up. She denies fever and chills. She denies nausea, vomiting, diarrhea or constipation. She reports minimal drain output from her remaining drains.   She completed her Lovenox prescr

## 2018-04-29 NOTE — TELEPHONE ENCOUNTER
----- Message from Milagros Espitia MD sent at 1/30/2017 11:22 AM CST -----  Normal mammogram.  Notify patient.
Spoke with patient informed of normal mammogram results, verbalized understanding.
I have personally seen and examined this patient.  I have fully participated in the care of this patient. I have reviewed all pertinent clinical information, including history, physical exam, plan and the Resident’s note and agree except as noted.

## 2018-05-03 ENCOUNTER — MEDICAL CORRESPONDENCE (OUTPATIENT)
Dept: SURGERY | Facility: CLINIC | Age: 43
End: 2018-05-03

## 2018-05-03 ENCOUNTER — TELEPHONE (OUTPATIENT)
Dept: SURGERY | Facility: CLINIC | Age: 43
End: 2018-05-03

## 2018-05-03 NOTE — TELEPHONE ENCOUNTER
Pt called regarding FMLA paperwork that had been dropped off by Pt. in [facility] on 4/27/18. Social Work has not yet received it, so she gave me the contact information for Sadie Serna.  Contacted Layla Fong, she stated that they send all FMLA &

## 2018-05-03 NOTE — PROGRESS NOTES
Received [facility] paperwork via fax, got signed by Dr. Jameel Olmstead, sent on to Social Work to be finished, received confirmation from SW of receipt.

## 2018-05-11 ENCOUNTER — OFFICE VISIT (OUTPATIENT)
Dept: SURGERY | Facility: CLINIC | Age: 43
End: 2018-05-11

## 2018-05-11 DIAGNOSIS — Z90.13 ABSENCE OF BREAST, ACQUIRED, BILATERAL: Primary | ICD-10-CM

## 2018-05-11 PROCEDURE — 99024 POSTOP FOLLOW-UP VISIT: CPT | Performed by: SURGERY

## 2018-05-11 NOTE — PROGRESS NOTES
Raul Ling is a 44year old female who presents today for a follow-up. She denies fever and chills. She denies nausea, vomiting, diarrhea or constipation. Physical Examination:  Breasts: Breast incisions are clean dry and intact.   Moderate macie

## 2018-06-04 ENCOUNTER — MEDICAL CORRESPONDENCE (OUTPATIENT)
Dept: SURGERY | Facility: CLINIC | Age: 43
End: 2018-06-04

## 2018-06-05 ENCOUNTER — SOCIAL WORK SERVICES (OUTPATIENT)
Dept: HEMATOLOGY/ONCOLOGY | Facility: HOSPITAL | Age: 43
End: 2018-06-05

## 2018-06-22 ENCOUNTER — OFFICE VISIT (OUTPATIENT)
Dept: SURGERY | Facility: CLINIC | Age: 43
End: 2018-06-22

## 2018-06-22 DIAGNOSIS — Z90.13 ABSENCE OF BREAST, ACQUIRED, BILATERAL: Primary | ICD-10-CM

## 2018-06-22 PROCEDURE — 99024 POSTOP FOLLOW-UP VISIT: CPT | Performed by: SURGERY

## 2018-06-22 NOTE — PROGRESS NOTES
Juan Francisco Hughes is a 44year old female who presents today for a follow-up. She is without new complaints. She would like to defer revision surgery until next year. Physical Examination:  Breasts:Bilateral breast incisions are c/d/i. Flaps are soft.

## 2018-06-25 ENCOUNTER — HOSPITAL ENCOUNTER (OUTPATIENT)
Age: 43
Discharge: HOME | End: 2018-06-25
Attending: FAMILY MEDICINE
Payer: COMMERCIAL

## 2018-06-25 VITALS
RESPIRATION RATE: 18 BRPM | BODY MASS INDEX: 22.66 KG/M2 | HEIGHT: 61 IN | HEART RATE: 69 BPM | TEMPERATURE: 98 F | WEIGHT: 120 LBS | OXYGEN SATURATION: 100 % | DIASTOLIC BLOOD PRESSURE: 62 MMHG | SYSTOLIC BLOOD PRESSURE: 121 MMHG

## 2018-06-25 DIAGNOSIS — M25.561 ACUTE PAIN OF RIGHT KNEE: Primary | ICD-10-CM

## 2018-06-25 PROCEDURE — 99213 OFFICE O/P EST LOW 20 MIN: CPT

## 2018-06-25 PROCEDURE — 99203 OFFICE O/P NEW LOW 30 MIN: CPT

## 2018-06-25 RX ORDER — IBUPROFEN 200 MG
600 TABLET ORAL EVERY 6 HOURS PRN
COMMUNITY
End: 2018-11-06

## 2018-06-25 RX ORDER — TRAMADOL HYDROCHLORIDE 50 MG/1
TABLET ORAL EVERY 6 HOURS PRN
Qty: 20 TABLET | Refills: 0 | Status: SHIPPED | OUTPATIENT
Start: 2018-06-25 | End: 2018-07-02

## 2018-06-25 NOTE — ED PROVIDER NOTES
Patient Seen in: [address]    History   Patient presents with:  Knee Pain    Stated Complaint: right knee swollen pain    HPI  81-year-old female presents to the IC with acute on chronic knee pain.   Has history of a tor noted above.     Physical Exam   ED Triage Vitals [06/25/18 1120]  BP: 121/62  Pulse: 69  Resp: 18  Temp: 97.7 °F (36.5 °C)  Temp src: Oral  SpO2: 100 %  O2 Device: None (Room air)    Current:/62   Pulse 69   Temp 97.7 °F (36.5 °C) (Oral)   Resp 18 of right knee  (primary encounter diagnosis)    Disposition:  Discharge  6/25/2018 11:30 am    Follow-up:  Ayush Martinez MD  [place] [address] [address] [address] [phone]    Schedule an appointment as soon as possible for a v

## 2018-06-25 NOTE — ED INITIAL ASSESSMENT (HPI)
Pt presents to the IC with c/o right knee pain, acute on chronic, s/p a hx of a meniscus tear years ago. Pain radiates up to the mid thigh since Saturday. Pt notes increase in swelling. Ambulatory with a limp. Arrives with a knee brace on. No pedal edema.

## 2018-07-12 ENCOUNTER — TELEPHONE (OUTPATIENT)
Dept: PEDIATRICS CLINIC | Facility: CLINIC | Age: 43
End: 2018-07-12

## 2018-11-04 ENCOUNTER — APPOINTMENT (OUTPATIENT)
Dept: GENERAL RADIOLOGY | Facility: HOSPITAL | Age: 43
End: 2018-11-04
Attending: EMERGENCY MEDICINE
Payer: COMMERCIAL

## 2018-11-04 ENCOUNTER — APPOINTMENT (OUTPATIENT)
Dept: CT IMAGING | Facility: HOSPITAL | Age: 43
End: 2018-11-04
Attending: EMERGENCY MEDICINE
Payer: COMMERCIAL

## 2018-11-04 ENCOUNTER — HOSPITAL ENCOUNTER (EMERGENCY)
Facility: HOSPITAL | Age: 43
Discharge: HOME | End: 2018-11-05
Attending: EMERGENCY MEDICINE
Payer: COMMERCIAL

## 2018-11-04 DIAGNOSIS — R07.89 CHEST WALL PAIN: Primary | ICD-10-CM

## 2018-11-04 PROCEDURE — 80076 HEPATIC FUNCTION PANEL: CPT | Performed by: EMERGENCY MEDICINE

## 2018-11-04 PROCEDURE — 71260 CT THORAX DX C+: CPT | Performed by: EMERGENCY MEDICINE

## 2018-11-04 PROCEDURE — 80048 BASIC METABOLIC PNL TOTAL CA: CPT | Performed by: EMERGENCY MEDICINE

## 2018-11-04 PROCEDURE — 93005 ELECTROCARDIOGRAM TRACING: CPT

## 2018-11-04 PROCEDURE — 84484 ASSAY OF TROPONIN QUANT: CPT | Performed by: EMERGENCY MEDICINE

## 2018-11-04 PROCEDURE — 85025 COMPLETE CBC W/AUTO DIFF WBC: CPT | Performed by: EMERGENCY MEDICINE

## 2018-11-04 PROCEDURE — 96374 THER/PROPH/DIAG INJ IV PUSH: CPT

## 2018-11-04 PROCEDURE — 83690 ASSAY OF LIPASE: CPT | Performed by: EMERGENCY MEDICINE

## 2018-11-04 PROCEDURE — 71045 X-RAY EXAM CHEST 1 VIEW: CPT | Performed by: EMERGENCY MEDICINE

## 2018-11-04 PROCEDURE — 81025 URINE PREGNANCY TEST: CPT

## 2018-11-04 PROCEDURE — 93010 ELECTROCARDIOGRAM REPORT: CPT | Performed by: EMERGENCY MEDICINE

## 2018-11-04 PROCEDURE — 99285 EMERGENCY DEPT VISIT HI MDM: CPT

## 2018-11-04 RX ORDER — KETOROLAC TROMETHAMINE 30 MG/ML
30 INJECTION, SOLUTION INTRAMUSCULAR; INTRAVENOUS ONCE
Status: COMPLETED | OUTPATIENT
Start: 2018-11-04 | End: 2018-11-04

## 2018-11-05 VITALS
OXYGEN SATURATION: 97 % | TEMPERATURE: 98 F | SYSTOLIC BLOOD PRESSURE: 94 MMHG | DIASTOLIC BLOOD PRESSURE: 54 MMHG | WEIGHT: 120 LBS | BODY MASS INDEX: 23 KG/M2 | RESPIRATION RATE: 18 BRPM | HEART RATE: 60 BPM

## 2018-11-05 NOTE — ED INITIAL ASSESSMENT (HPI)
Triage:  Patient c/o midsternal CP that radiates around back. Describes pain as \"squeezing\", states getting worse since Friday. Unable to sleep.

## 2018-11-05 NOTE — ED PROVIDER NOTES
Patient Seen in: [facility] AND [facility] Emergency Department    History   Patient presents with:  Chest Pain Angina (cardiovascular)    Stated Complaint: chest pain, back pain    HPI    81-year-old female status post gastric bypass as well as status post bila pain, back pain  Other systems are as noted in HPI. Constitutional and vital signs reviewed. All other systems reviewed and negative except as noted above.     Physical Exam     ED Triage Vitals   BP 11/04/18 2054 146/80   Pulse 11/04/18 2054 62   Res PREGNANCY URINE - Normal   CBC WITH DIFFERENTIAL WITH PLATELET    Narrative: The following orders were created for panel order CBC WITH DIFFERENTIAL WITH PLATELET.   Procedure                               Abnormality         Status the Toradol dosing.   She can follow-up tomorrow with her primary care doctor and she knows to return with any worsening or change per            Disposition and Plan     Clinical Impression:  Chest wall pain  (primary encounter diagnosis)    Disposition:

## 2018-11-05 NOTE — ED NOTES
Pt c/o a squeezing pain to bl scapulae, wrapping around bl flanks and to the upper abd. Pt denies pain to the chest at this time, but states that she had mid-chest pain x2days ago. Pt also mentioned nausea and \"soft stools\" x2wk.  Pt denies sob, bl arm pa

## 2018-11-05 NOTE — ED NOTES
Pt provided with discharge instructions. Verbalized understanding for plan of care at home and follow up. All questions/concerns addressed prior to discharge. IV removed, catheter intact.  Pt ambulated out of er with steady gait

## 2018-11-06 ENCOUNTER — OFFICE VISIT (OUTPATIENT)
Dept: OBGYN CLINIC | Facility: CLINIC | Age: 43
End: 2018-11-06
Payer: COMMERCIAL

## 2018-11-06 ENCOUNTER — TELEPHONE (OUTPATIENT)
Dept: OBGYN CLINIC | Facility: CLINIC | Age: 43
End: 2018-11-06

## 2018-11-06 VITALS
HEIGHT: 61 IN | WEIGHT: 127.63 LBS | DIASTOLIC BLOOD PRESSURE: 77 MMHG | HEART RATE: 51 BPM | SYSTOLIC BLOOD PRESSURE: 124 MMHG | BODY MASS INDEX: 24.1 KG/M2

## 2018-11-06 DIAGNOSIS — Z15.01 BRCA1 POSITIVE: Primary | ICD-10-CM

## 2018-11-06 DIAGNOSIS — Z15.09 BRCA1 POSITIVE: Primary | ICD-10-CM

## 2018-11-06 DIAGNOSIS — Z15.01 BRCA1 GENE MUTATION POSITIVE: Primary | ICD-10-CM

## 2018-11-06 DIAGNOSIS — Z15.09 BRCA1 GENE MUTATION POSITIVE: Primary | ICD-10-CM

## 2018-11-06 PROCEDURE — 99214 OFFICE O/P EST MOD 30 MIN: CPT | Performed by: OBSTETRICS & GYNECOLOGY

## 2018-11-06 NOTE — TELEPHONE ENCOUNTER
Pt has been scheduled for 1/30 @ 7:30.   Awaiting 2019 calendar to enter in our sx book.    -assist pending

## 2018-11-06 NOTE — TELEPHONE ENCOUNTER
Please schedule for Exploratory Laparotomy, Bilateral Salpingo-Oophorectomy on Wednesday, 1/30/2019. Diagnosis is + BRCA 1 Gene Mutation. MD Assist needed.

## 2018-11-06 NOTE — PROGRESS NOTES
HPI:    Patient ID: Beverly Quevedo is a 44year old female. Patient here for Pre-Op exam for Exploratory Laparotomy and Bilateral Salpingo-Oophorectomy due to BRCA 1 + gene mutation. Patient is S/P bilateral mastectomy and recommended to have BSO.   Shanda Willard heart sounds. No murmur heard. Pulmonary/Chest: Effort normal and breath sounds normal.   Breast Exam:  No masses. No nipple discharge. No adenopathy. Abdominal: Soft. Bowel sounds are normal. She exhibits no mass. There is no tenderness.    [facility]

## 2018-12-20 ENCOUNTER — TELEPHONE (OUTPATIENT)
Dept: OBGYN CLINIC | Facility: CLINIC | Age: 43
End: 2018-12-20

## 2018-12-20 NOTE — TELEPHONE ENCOUNTER
PER PT STATE SHE'S SCHEDULE FOR SURGERY ON 01/30/19 / PT WOULD LIKE TO KNOW IF SHE NEED ANY PRESURGICAL CLEARANCE FROM HER PCP  / SISSY ADV

## 2018-12-20 NOTE — TELEPHONE ENCOUNTER
Called pt to informed her of sx details, and  MLM placed orders in system for labs to be drawn prior sx. No PCP clearance noted in MLM's office note(11/06/18). She Understood and verbalized agreement. Routing to [facility] for FYI.

## 2019-01-02 NOTE — TELEPHONE ENCOUNTER
Thank you Dr. Mahogany Mcghee. Sent sx request as you as the assist.  Routing to [facility] for FYI.

## 2019-01-11 NOTE — TELEPHONE ENCOUNTER
Received fax from [place], [facility] [address]. Requesting pts policy number. Faxed to pre-cert dept.    ATT: [place]: [phone]

## 2019-01-12 NOTE — H&P
[facility]    PATIENT'S NAME: Lacho Christine DEAN   ATTENDING PHYSICIAN: Earl Valera MD   PATIENT ACCOUNT#:   [id number]    LOCATION:    MEDICAL RECORD #:   [id number]       YOB: 1975  ADMISSION DATE:       01/30/2019    HISTORY A distress. VITAL SIGNS:  Blood pressure 124/77, pulse 51, respirations 16. Weight 127 pounds. BMI of 24. NECK:  Supple, without thyromegaly. LUNGS:  Clear to auscultation. HEART:  Regular rate and rhythm with normal S1, S2, without murmur.   BREASTS:

## 2019-01-22 NOTE — TELEPHONE ENCOUNTER
Called [employer] and spoke rep Yady Fuentes who will follow up with a different department who is in charge for authorization. Did confirm they received most recent fax.    REF# [id number]

## 2019-01-23 ENCOUNTER — TELEPHONE (OUTPATIENT)
Dept: OBGYN CLINIC | Facility: CLINIC | Age: 44
End: 2019-01-23

## 2019-01-23 NOTE — TELEPHONE ENCOUNTER
Emailed insurance twice, spoke to [place] rep to f/u and still no reply from dept who is in charge for . Per rep takes 48 hours.  1/23/2019 vl

## 2019-01-23 NOTE — TELEPHONE ENCOUNTER
Contacted pt to inform of sx auth status. Pt will contact insurance to expedite process. Pt provided a secondary insurance. Spouses name;   [facility] (SNV:[id number])  [facility]  [id number]  G#[id number]   [facility]: [phone]  Funds office: [phone]

## 2019-01-23 NOTE — TELEPHONE ENCOUNTER
Pt would like to know if she needs surgery clearance from pcp prior to surgery, she does know she has to get lab work done, but wondering if there is anything more that is needed.  pls adv

## 2019-01-24 NOTE — TELEPHONE ENCOUNTER
Per Mary Flores at [facility], the [facility] never received clinicals or fax where they were requesting additional info. Mary Flores suggested to re-fax all faxes again to,  F: [phone]  Faxed original clinicals, email and their fax.   Confirmation from fax received

## 2019-01-24 NOTE — TELEPHONE ENCOUNTER
Pt informed nothing further needed per [facility] message below. She Understood and verbalized agreement. Will go in mon or Tuesday for lab work.

## 2019-01-24 NOTE — TELEPHONE ENCOUNTER
Submitted pre-cert for secondary ins. Pending ref# [id number]  Clinicals faxed to [facility].

## 2019-01-28 ENCOUNTER — LAB ENCOUNTER (OUTPATIENT)
Dept: LAB | Facility: HOSPITAL | Age: 44
End: 2019-01-28
Attending: OBSTETRICS & GYNECOLOGY
Payer: COMMERCIAL

## 2019-01-28 DIAGNOSIS — Z15.01 BRCA1 GENE MUTATION POSITIVE: ICD-10-CM

## 2019-01-28 DIAGNOSIS — Z15.09 BRCA1 GENE MUTATION POSITIVE: ICD-10-CM

## 2019-01-28 LAB
ANTIBODY SCREEN: NEGATIVE
BASOPHILS # BLD: 0 K/UL (ref 0–0.2)
BASOPHILS NFR BLD: 1 %
EOSINOPHIL # BLD: 0.2 K/UL (ref 0–0.7)
EOSINOPHIL NFR BLD: 2 %
ERYTHROCYTE [DISTWIDTH] IN BLOOD BY AUTOMATED COUNT: 15.2 % (ref 11–15)
HCT VFR BLD AUTO: 31.9 % (ref 35–48)
HGB BLD-MCNC: 10.3 G/DL (ref 12–16)
LYMPHOCYTES # BLD: 2 K/UL (ref 1–4)
LYMPHOCYTES NFR BLD: 30 %
MCH RBC QN AUTO: 24.9 PG (ref 27–32)
MCHC RBC AUTO-ENTMCNC: 32.4 G/DL (ref 32–37)
MCV RBC AUTO: 76.9 FL (ref 80–100)
MONOCYTES # BLD: 0.5 K/UL (ref 0–1)
MONOCYTES NFR BLD: 7 %
NEUTROPHILS # BLD AUTO: 4.1 K/UL (ref 1.8–7.7)
NEUTROPHILS NFR BLD: 60 %
PLATELET # BLD AUTO: 297 K/UL (ref 140–400)
PMV BLD AUTO: 9.1 FL (ref 7.4–10.3)
RBC # BLD AUTO: 4.15 M/UL (ref 3.7–5.4)
RH BLOOD TYPE: POSITIVE
WBC # BLD AUTO: 6.8 K/UL (ref 4–11)

## 2019-01-28 PROCEDURE — 36415 COLL VENOUS BLD VENIPUNCTURE: CPT

## 2019-01-28 PROCEDURE — 86901 BLOOD TYPING SEROLOGIC RH(D): CPT

## 2019-01-28 PROCEDURE — 85025 COMPLETE CBC W/AUTO DIFF WBC: CPT

## 2019-01-28 PROCEDURE — 86900 BLOOD TYPING SEROLOGIC ABO: CPT

## 2019-01-28 PROCEDURE — 86850 RBC ANTIBODY SCREEN: CPT

## 2019-01-29 NOTE — TELEPHONE ENCOUNTER
RN, Shea Cohen provided clinicals over the phone. RN at [facility] requested cliniclas faxed over as well. Faxed to [phone]    auth pending (from secondary ins)    Vee Altamirano at primary ins of [place] is still pending.   Due to sx coming tomorrow, sx has been changed to

## 2019-01-30 ENCOUNTER — HOSPITAL ENCOUNTER (INPATIENT)
Facility: HOSPITAL | Age: 44
LOS: 2 days | Discharge: HOME | DRG: 743 | End: 2019-02-01
Attending: OBSTETRICS & GYNECOLOGY | Admitting: OBSTETRICS & GYNECOLOGY
Payer: COMMERCIAL

## 2019-01-30 ENCOUNTER — ANESTHESIA (OUTPATIENT)
Dept: SURGERY | Facility: HOSPITAL | Age: 44
DRG: 743 | End: 2019-01-30
Payer: COMMERCIAL

## 2019-01-30 ENCOUNTER — ANESTHESIA EVENT (OUTPATIENT)
Dept: SURGERY | Facility: HOSPITAL | Age: 44
DRG: 743 | End: 2019-01-30
Payer: COMMERCIAL

## 2019-01-30 DIAGNOSIS — Z15.01 BRCA1 POSITIVE: ICD-10-CM

## 2019-01-30 DIAGNOSIS — Z15.09 BRCA1 POSITIVE: ICD-10-CM

## 2019-01-30 LAB — B-HCG UR QL: NEGATIVE

## 2019-01-30 PROCEDURE — 58720 REMOVAL OF OVARY/TUBE(S): CPT | Performed by: OBSTETRICS & GYNECOLOGY

## 2019-01-30 PROCEDURE — 0UT70ZZ RESECTION OF BILATERAL FALLOPIAN TUBES, OPEN APPROACH: ICD-10-PCS | Performed by: OBSTETRICS & GYNECOLOGY

## 2019-01-30 PROCEDURE — 0UT20ZZ RESECTION OF BILATERAL OVARIES, OPEN APPROACH: ICD-10-PCS | Performed by: OBSTETRICS & GYNECOLOGY

## 2019-01-30 RX ORDER — HYDROCODONE BITARTRATE AND ACETAMINOPHEN 5; 325 MG/1; MG/1
1 TABLET ORAL AS NEEDED
Status: DISCONTINUED | OUTPATIENT
Start: 2019-01-30 | End: 2019-01-30 | Stop reason: HOSPADM

## 2019-01-30 RX ORDER — NALBUPHINE HCL 10 MG/ML
2.5 AMPUL (ML) INJECTION EVERY 4 HOURS PRN
Status: DISCONTINUED | OUTPATIENT
Start: 2019-01-30 | End: 2019-02-01

## 2019-01-30 RX ORDER — FAMOTIDINE 20 MG/1
20 TABLET ORAL ONCE
Status: DISCONTINUED | OUTPATIENT
Start: 2019-01-30 | End: 2019-01-30 | Stop reason: HOSPADM

## 2019-01-30 RX ORDER — CLINDAMYCIN PHOSPHATE 150 MG/ML
INJECTION, SOLUTION INTRAVENOUS AS NEEDED
Status: DISCONTINUED | OUTPATIENT
Start: 2019-01-30 | End: 2019-01-30 | Stop reason: SURG

## 2019-01-30 RX ORDER — ACETAMINOPHEN 500 MG
1000 TABLET ORAL ONCE
Status: COMPLETED | OUTPATIENT
Start: 2019-01-30 | End: 2019-01-30

## 2019-01-30 RX ORDER — CLINDAMYCIN PHOSPHATE 900 MG/50ML
900 INJECTION INTRAVENOUS ONCE
Status: DISCONTINUED | OUTPATIENT
Start: 2019-01-30 | End: 2019-01-30 | Stop reason: HOSPADM

## 2019-01-30 RX ORDER — MORPHINE SULFATE 10 MG/ML
6 INJECTION, SOLUTION INTRAMUSCULAR; INTRAVENOUS EVERY 10 MIN PRN
Status: DISCONTINUED | OUTPATIENT
Start: 2019-01-30 | End: 2019-01-30 | Stop reason: HOSPADM

## 2019-01-30 RX ORDER — DIPHENHYDRAMINE HYDROCHLORIDE 50 MG/ML
12.5 INJECTION INTRAMUSCULAR; INTRAVENOUS EVERY 4 HOURS PRN
Status: DISCONTINUED | OUTPATIENT
Start: 2019-01-30 | End: 2019-02-01

## 2019-01-30 RX ORDER — ACETAMINOPHEN 500 MG
1000 TABLET ORAL EVERY 6 HOURS PRN
Status: DISCONTINUED | OUTPATIENT
Start: 2019-01-30 | End: 2019-02-01

## 2019-01-30 RX ORDER — MORPHINE SULFATE 4 MG/ML
4 INJECTION, SOLUTION INTRAMUSCULAR; INTRAVENOUS EVERY 10 MIN PRN
Status: DISCONTINUED | OUTPATIENT
Start: 2019-01-30 | End: 2019-01-30 | Stop reason: HOSPADM

## 2019-01-30 RX ORDER — HYDROMORPHONE HYDROCHLORIDE 1 MG/ML
INJECTION, SOLUTION INTRAMUSCULAR; INTRAVENOUS; SUBCUTANEOUS AS NEEDED
Status: DISCONTINUED | OUTPATIENT
Start: 2019-01-30 | End: 2019-01-30 | Stop reason: SURG

## 2019-01-30 RX ORDER — MORPHINE SULFATE 4 MG/ML
2 INJECTION, SOLUTION INTRAMUSCULAR; INTRAVENOUS EVERY 10 MIN PRN
Status: DISCONTINUED | OUTPATIENT
Start: 2019-01-30 | End: 2019-01-30 | Stop reason: HOSPADM

## 2019-01-30 RX ORDER — ROCURONIUM BROMIDE 10 MG/ML
INJECTION, SOLUTION INTRAVENOUS AS NEEDED
Status: DISCONTINUED | OUTPATIENT
Start: 2019-01-30 | End: 2019-01-30 | Stop reason: SURG

## 2019-01-30 RX ORDER — HYDROCODONE BITARTRATE AND ACETAMINOPHEN 5; 325 MG/1; MG/1
2 TABLET ORAL AS NEEDED
Status: DISCONTINUED | OUTPATIENT
Start: 2019-01-30 | End: 2019-01-30 | Stop reason: HOSPADM

## 2019-01-30 RX ORDER — GLYCOPYRROLATE 0.2 MG/ML
INJECTION INTRAMUSCULAR; INTRAVENOUS AS NEEDED
Status: DISCONTINUED | OUTPATIENT
Start: 2019-01-30 | End: 2019-01-30 | Stop reason: SURG

## 2019-01-30 RX ORDER — SODIUM CHLORIDE 9 MG/ML
INJECTION, SOLUTION INTRAVENOUS CONTINUOUS PRN
Status: DISCONTINUED | OUTPATIENT
Start: 2019-01-30 | End: 2019-01-30 | Stop reason: SURG

## 2019-01-30 RX ORDER — ONDANSETRON 2 MG/ML
4 INJECTION INTRAMUSCULAR; INTRAVENOUS EVERY 6 HOURS PRN
Status: DISCONTINUED | OUTPATIENT
Start: 2019-01-30 | End: 2019-02-01

## 2019-01-30 RX ORDER — HALOPERIDOL 5 MG/ML
0.25 INJECTION INTRAMUSCULAR ONCE AS NEEDED
Status: DISCONTINUED | OUTPATIENT
Start: 2019-01-30 | End: 2019-01-30 | Stop reason: HOSPADM

## 2019-01-30 RX ORDER — NALOXONE HYDROCHLORIDE 0.4 MG/ML
80 INJECTION, SOLUTION INTRAMUSCULAR; INTRAVENOUS; SUBCUTANEOUS AS NEEDED
Status: DISCONTINUED | OUTPATIENT
Start: 2019-01-30 | End: 2019-01-30 | Stop reason: HOSPADM

## 2019-01-30 RX ORDER — DEXTROSE, SODIUM CHLORIDE, SODIUM LACTATE, POTASSIUM CHLORIDE, AND CALCIUM CHLORIDE 5; .6; .31; .03; .02 G/100ML; G/100ML; G/100ML; G/100ML; G/100ML
INJECTION, SOLUTION INTRAVENOUS CONTINUOUS
Status: DISCONTINUED | OUTPATIENT
Start: 2019-01-30 | End: 2019-01-31

## 2019-01-30 RX ORDER — SODIUM CHLORIDE, SODIUM LACTATE, POTASSIUM CHLORIDE, CALCIUM CHLORIDE 600; 310; 30; 20 MG/100ML; MG/100ML; MG/100ML; MG/100ML
INJECTION, SOLUTION INTRAVENOUS CONTINUOUS
Status: DISCONTINUED | OUTPATIENT
Start: 2019-01-30 | End: 2019-01-30 | Stop reason: HOSPADM

## 2019-01-30 RX ORDER — NEOSTIGMINE METHYLSULFATE 0.5 MG/ML
INJECTION INTRAVENOUS AS NEEDED
Status: DISCONTINUED | OUTPATIENT
Start: 2019-01-30 | End: 2019-01-30 | Stop reason: SURG

## 2019-01-30 RX ORDER — NALOXONE HYDROCHLORIDE 0.4 MG/ML
0.08 INJECTION, SOLUTION INTRAMUSCULAR; INTRAVENOUS; SUBCUTANEOUS
Status: DISCONTINUED | OUTPATIENT
Start: 2019-01-30 | End: 2019-02-01

## 2019-01-30 RX ORDER — DEXAMETHASONE SODIUM PHOSPHATE 4 MG/ML
VIAL (ML) INJECTION AS NEEDED
Status: DISCONTINUED | OUTPATIENT
Start: 2019-01-30 | End: 2019-01-30 | Stop reason: SURG

## 2019-01-30 RX ORDER — HYDROCODONE BITARTRATE AND ACETAMINOPHEN 7.5; 325 MG/1; MG/1
1 TABLET ORAL EVERY 6 HOURS PRN
Status: DISCONTINUED | OUTPATIENT
Start: 2019-01-31 | End: 2019-02-01

## 2019-01-30 RX ORDER — ONDANSETRON 2 MG/ML
4 INJECTION INTRAMUSCULAR; INTRAVENOUS ONCE AS NEEDED
Status: DISCONTINUED | OUTPATIENT
Start: 2019-01-30 | End: 2019-01-30 | Stop reason: HOSPADM

## 2019-01-30 RX ORDER — EPHEDRINE SULFATE 50 MG/ML
INJECTION, SOLUTION INTRAVENOUS AS NEEDED
Status: DISCONTINUED | OUTPATIENT
Start: 2019-01-30 | End: 2019-01-30 | Stop reason: SURG

## 2019-01-30 RX ORDER — FAMOTIDINE 20 MG/1
20 TABLET ORAL 2 TIMES DAILY
Status: DISCONTINUED | OUTPATIENT
Start: 2019-01-30 | End: 2019-02-01

## 2019-01-30 RX ORDER — ONDANSETRON 2 MG/ML
INJECTION INTRAMUSCULAR; INTRAVENOUS AS NEEDED
Status: DISCONTINUED | OUTPATIENT
Start: 2019-01-30 | End: 2019-01-30 | Stop reason: SURG

## 2019-01-30 RX ORDER — MIDAZOLAM HYDROCHLORIDE 1 MG/ML
INJECTION INTRAMUSCULAR; INTRAVENOUS AS NEEDED
Status: DISCONTINUED | OUTPATIENT
Start: 2019-01-30 | End: 2019-01-30 | Stop reason: SURG

## 2019-01-30 RX ORDER — SODIUM CHLORIDE, SODIUM LACTATE, POTASSIUM CHLORIDE, CALCIUM CHLORIDE 600; 310; 30; 20 MG/100ML; MG/100ML; MG/100ML; MG/100ML
INJECTION, SOLUTION INTRAVENOUS CONTINUOUS
Status: DISCONTINUED | OUTPATIENT
Start: 2019-01-30 | End: 2019-01-31

## 2019-01-30 RX ORDER — BUPIVACAINE HYDROCHLORIDE AND EPINEPHRINE 5; 5 MG/ML; UG/ML
INJECTION, SOLUTION PERINEURAL AS NEEDED
Status: DISCONTINUED | OUTPATIENT
Start: 2019-01-30 | End: 2019-01-30 | Stop reason: HOSPADM

## 2019-01-30 RX ORDER — SODIUM CHLORIDE 0.9 % (FLUSH) 0.9 %
10 SYRINGE (ML) INJECTION AS NEEDED
Status: DISCONTINUED | OUTPATIENT
Start: 2019-01-30 | End: 2019-02-01

## 2019-01-30 RX ORDER — METOCLOPRAMIDE 10 MG/1
10 TABLET ORAL ONCE
Status: DISCONTINUED | OUTPATIENT
Start: 2019-01-30 | End: 2019-01-30 | Stop reason: HOSPADM

## 2019-01-30 RX ADMIN — ROCURONIUM BROMIDE 10 MG: 10 INJECTION, SOLUTION INTRAVENOUS at 08:00:00

## 2019-01-30 RX ADMIN — CLINDAMYCIN PHOSPHATE 900 MG: 150 INJECTION, SOLUTION INTRAVENOUS at 07:35:00

## 2019-01-30 RX ADMIN — HYDROMORPHONE HYDROCHLORIDE 0.25 MG: 1 INJECTION, SOLUTION INTRAMUSCULAR; INTRAVENOUS; SUBCUTANEOUS at 08:05:00

## 2019-01-30 RX ADMIN — HYDROMORPHONE HYDROCHLORIDE 0.25 MG: 1 INJECTION, SOLUTION INTRAMUSCULAR; INTRAVENOUS; SUBCUTANEOUS at 07:58:00

## 2019-01-30 RX ADMIN — SODIUM CHLORIDE, SODIUM LACTATE, POTASSIUM CHLORIDE, CALCIUM CHLORIDE: 600; 310; 30; 20 INJECTION, SOLUTION INTRAVENOUS at 07:55:00

## 2019-01-30 RX ADMIN — EPHEDRINE SULFATE 10 MG: 50 INJECTION, SOLUTION INTRAVENOUS at 07:35:00

## 2019-01-30 RX ADMIN — GLYCOPYRROLATE 0.2 MG: 0.2 INJECTION INTRAMUSCULAR; INTRAVENOUS at 07:33:00

## 2019-01-30 RX ADMIN — NEOSTIGMINE METHYLSULFATE 3.5 MG: 0.5 INJECTION INTRAVENOUS at 08:35:00

## 2019-01-30 RX ADMIN — SODIUM CHLORIDE, SODIUM LACTATE, POTASSIUM CHLORIDE, CALCIUM CHLORIDE: 600; 310; 30; 20 INJECTION, SOLUTION INTRAVENOUS at 07:28:00

## 2019-01-30 RX ADMIN — ONDANSETRON 4 MG: 2 INJECTION INTRAMUSCULAR; INTRAVENOUS at 07:25:00

## 2019-01-30 RX ADMIN — GLYCOPYRROLATE 0.7 MG: 0.2 INJECTION INTRAMUSCULAR; INTRAVENOUS at 08:35:00

## 2019-01-30 RX ADMIN — ROCURONIUM BROMIDE 20 MG: 10 INJECTION, SOLUTION INTRAVENOUS at 07:45:00

## 2019-01-30 RX ADMIN — SODIUM CHLORIDE: 9 INJECTION, SOLUTION INTRAVENOUS at 07:55:00

## 2019-01-30 RX ADMIN — DEXAMETHASONE SODIUM PHOSPHATE 4 MG: 4 MG/ML VIAL (ML) INJECTION at 08:00:00

## 2019-01-30 RX ADMIN — SODIUM CHLORIDE: 9 INJECTION, SOLUTION INTRAVENOUS at 07:28:00

## 2019-01-30 RX ADMIN — ROCURONIUM BROMIDE 10 MG: 10 INJECTION, SOLUTION INTRAVENOUS at 07:36:00

## 2019-01-30 RX ADMIN — MIDAZOLAM HYDROCHLORIDE 2 MG: 1 INJECTION INTRAMUSCULAR; INTRAVENOUS at 07:25:00

## 2019-01-30 NOTE — OPERATIVE REPORT
[facility]    PATIENT'S NAME: Jake DEAN   ATTENDING PHYSICIAN: Earl Bello MD   OPERATING PHYSICIAN: Hood Bello MD   PATIENT ACCOUNT#:   [id number]    LOCATION:  [address] [facility] 10  MEDICAL RECORD #:   [id number] the left ovary and fallopian tube were completely removed in a similar fashion on the right side.   The LigaSure was used to cauterize the infundibulopelvic ligament and then cauterize down the broad ligament, down to the cornua, where the tube and suspenso

## 2019-01-30 NOTE — INTERVAL H&P NOTE
Pre-op Diagnosis: BRCA1 positive [Z15.01, Z15.09]    The above referenced H&P was reviewed by Edelmira Martinez MD on 1/30/2019, the patient was examined and no significant changes have occurred in the patient's condition since the H&P was performed.   I d

## 2019-01-30 NOTE — ANESTHESIA PREPROCEDURE EVALUATION
Anesthesia PreOp Note    HPI:     Isiah Bustos is a 44year old female who presents for preoperative consultation requested by: Gilmer Reaves MD    Date of Surgery: 1/30/2019    Procedure(s):  EXPLORATORY LAPAROTOMY  LAPAROTOMY SALPINGO OOPHORECTOMY Bilateral 4/9/2018    Performed by Dev Song MD at [address] [place] [address] MAIN OR   • MICRO FLAP Bilateral 4/9/2018    Performed by Christine Sweet MD at [address] [place] [address] OR   • OTHER SURGICAL HISTORY  1999    boil surgery         Medications Prior to Admission:  ferrous sulfat Adilene Danielson CRNA 5 mg at 01/30/19 2321   succinylcholine chloride (ANECTINE) injection  Intravenous PRN Sheree Sands CRNA 100 mg at 01/30/19 9762     No current [facility]-ordered outpatient medications on file.       Penicillins                 Co Tobacco Use      Smoking status: Never Smoker      Smokeless tobacco: Never Used    Substance and Sexual Activity      Alcohol use: No      Drug use: No      Sexual activity: Not on file    Other Topics      Concerns:         Service: Not Asked Airway   Mallampati: I  TM distance: >3 FB  Neck ROM: full  Dental - normal exam     Pulmonary - negative ROS and normal exam   Cardiovascular - negative ROS and normal exam  Exercise tolerance: good    Neuro/Psych - negative ROS     GI/Hepatic/Renal - neg

## 2019-01-30 NOTE — PROGRESS NOTES
Right upper ear lobe earring. Patient refused to remove.  Dr Franck Burroughs , Dr. Amarjit Heck CRNA notified, OR RN aware

## 2019-01-30 NOTE — BRIEF OP NOTE
Pre-Operative Diagnosis: BRCA1 positive [Z15.01, Z15.09]     Post-Operative Diagnosis: BRCA1 positive [Z15.01, Z15.09], gene susceptibility with malignant neoplasm      Procedure Performed:   Procedure(s):  Exploratory Laparotomy, Bilateral Salpingo-Oophor

## 2019-01-30 NOTE — ANESTHESIA PROCEDURE NOTES
ANESTHESIA INTUBATION  Date/Time: 1/30/2019 7:40 AM  Urgency: elective    Airway not difficult    General Information and Staff    Patient location during procedure: OR  Anesthesiologist: Yue Lance MD  Resident/CRNA: Maria Alejandra Perry CRNA Pe

## 2019-01-30 NOTE — ANESTHESIA POSTPROCEDURE EVALUATION
Patient: Luis Armando North    Procedure Summary     Date:  01/30/19 Room / Location:  [address] [place] [address] MAIN OR 01 / [address] [facility] OR    Anesthesia Start:  2771 Anesthesia Stop:      Procedures:       EXPLORATORY LAPAROTOMY (N/A Abdomen)      LAPAROTOMY SALPINGO OOPHORECTOMY (N

## 2019-01-31 ENCOUNTER — MED REC SCAN ONLY (OUTPATIENT)
Dept: OBGYN CLINIC | Facility: CLINIC | Age: 44
End: 2019-01-31

## 2019-01-31 LAB
BASOPHILS # BLD AUTO: 0.02 X10(3) UL (ref 0–0.2)
BASOPHILS NFR BLD AUTO: 0.3 %
DEPRECATED RDW RBC AUTO: 42.2 FL (ref 35.1–46.3)
EOSINOPHIL # BLD AUTO: 0.03 X10(3) UL (ref 0–0.7)
EOSINOPHIL NFR BLD AUTO: 0.4 %
ERYTHROCYTE [DISTWIDTH] IN BLOOD BY AUTOMATED COUNT: 14.5 % (ref 11–15)
HCT VFR BLD AUTO: 26.7 % (ref 35–48)
HGB BLD-MCNC: 8.1 G/DL (ref 12–16)
IMM GRANULOCYTES # BLD AUTO: 0.02 X10(3) UL (ref 0–1)
IMM GRANULOCYTES NFR BLD: 0.3 %
LYMPHOCYTES # BLD AUTO: 1.73 X10(3) UL (ref 1–4)
LYMPHOCYTES NFR BLD AUTO: 24.7 %
MCH RBC QN AUTO: 24.2 PG (ref 26–34)
MCHC RBC AUTO-ENTMCNC: 30.3 G/DL (ref 31–37)
MCV RBC AUTO: 79.7 FL (ref 80–100)
MONOCYTES # BLD AUTO: 0.37 X10(3) UL (ref 0.1–1)
MONOCYTES NFR BLD AUTO: 5.3 %
NEUTROPHILS # BLD AUTO: 4.84 X10 (3) UL (ref 1.5–7.7)
NEUTROPHILS # BLD AUTO: 4.84 X10(3) UL (ref 1.5–7.7)
NEUTROPHILS NFR BLD AUTO: 69 %
PLATELET # BLD AUTO: 223 10(3)UL (ref 150–450)
RBC # BLD AUTO: 3.35 X10(6)UL (ref 3.8–5.3)
WBC # BLD AUTO: 7 X10(3) UL (ref 4–11)

## 2019-01-31 NOTE — TELEPHONE ENCOUNTER
[place] approved sx. For 7 days C# [id number]    Second ins. Approved in patient.   F# [phone]

## 2019-01-31 NOTE — PROGRESS NOTES
[facility]    OB/GYNE Progress Note      [place] Patient Status:  Inpatient    1975 MRN [id number]   Location [facility] 4W/SW/SE Attending Garry Pillai MD   Hosp Day # 1 PCP DO Barbara Brannon 11/04/2018     (H) 11/04/2018    CA 8.8 11/04/2018    ALB 3.8 11/04/2018    ALKPHO 94 11/04/2018    BILT 0.3 11/04/2018    TP 7.5 11/04/2018    AST 24 11/04/2018    ALT 19 11/04/2018    TSH 3.81 03/27/2018    LIP 41 11/04/2018       Lab Results   Co

## 2019-01-31 NOTE — PLAN OF CARE
Patient received from PACU. PCA maintained and used for pain management. Vital signs WNL. Mercado intact. IVF. Patient tolerating diet- advanced to full liquid for dinner. Safety measures in place.

## 2019-02-01 ENCOUNTER — TELEPHONE (OUTPATIENT)
Dept: OBGYN CLINIC | Facility: CLINIC | Age: 44
End: 2019-02-01

## 2019-02-01 VITALS
DIASTOLIC BLOOD PRESSURE: 78 MMHG | TEMPERATURE: 99 F | HEIGHT: 61 IN | WEIGHT: 123.13 LBS | RESPIRATION RATE: 18 BRPM | OXYGEN SATURATION: 100 % | BODY MASS INDEX: 23.25 KG/M2 | HEART RATE: 76 BPM | SYSTOLIC BLOOD PRESSURE: 118 MMHG

## 2019-02-01 RX ORDER — HYDROCODONE BITARTRATE AND ACETAMINOPHEN 7.5; 325 MG/1; MG/1
1 TABLET ORAL EVERY 6 HOURS PRN
Qty: 10 TABLET | Refills: 0 | Status: SHIPPED | OUTPATIENT
Start: 2019-02-01 | End: 2019-04-11 | Stop reason: ALTCHOICE

## 2019-02-01 NOTE — PLAN OF CARE
Problem: Patient Centered Care  Goal: Patient preferences are identified and integrated in the patient's plan of care  Interventions:  - Provide timely, complete, and accurate information to patient/family  - Incorporate patient and family knowledge, value the patient needs post-hospital services based on physician/LIP order or complex needs related to functional status, cognitive ability or social support system  Outcome: Progressing      Comments: Meghan Ashley is aware of POC at this time. Vital signs WNL.  Pain m

## 2019-02-01 NOTE — DISCHARGE SUMMARY
[facility]    Discharge Summary    Carlos Enriquenoah Teagueil Patient Status:  Inpatient    1975 MRN [id number]   Location [facility] 4W/SW/SE Attending Micky Cifuentes MD   Hosp Day # 2 PCP Svetlana Bowens,      Date of Admiss Follow-up Information     Micky Cifuentes MD. Schedule an appointment as soon as possible for a visit in 4 weeks.     Specialty:  OBSTETRICS & GYNECOLOGY  Why:  Post-Op Exam  Contact information:  [address] [place] [phone]

## 2019-02-05 ENCOUNTER — NURSE ONLY (OUTPATIENT)
Dept: OBGYN CLINIC | Facility: CLINIC | Age: 44
End: 2019-02-05
Payer: COMMERCIAL

## 2019-02-05 VITALS — DIASTOLIC BLOOD PRESSURE: 75 MMHG | SYSTOLIC BLOOD PRESSURE: 115 MMHG

## 2019-02-05 DIAGNOSIS — Z48.02 ENCOUNTER FOR STAPLE REMOVAL: Primary | ICD-10-CM

## 2019-02-05 PROCEDURE — 99024 POSTOP FOLLOW-UP VISIT: CPT | Performed by: OBSTETRICS & GYNECOLOGY

## 2019-03-06 ENCOUNTER — TELEPHONE (OUTPATIENT)
Dept: PEDIATRICS CLINIC | Facility: CLINIC | Age: 44
End: 2019-03-06

## 2019-03-06 NOTE — TELEPHONE ENCOUNTER
Called pt and left a detailed message per her request. If further questions/concerns pt to call back.

## 2019-03-06 NOTE — TELEPHONE ENCOUNTER
Pt had Bilateral Salpingo-oophorectomy on 1/30/19. Per pt states that today she had a really bad cramp 10/10 pain. States it was very debilitating and was hunched over for about 5 minutes in fetal position. States she took two tylenol and symptoms stopped.

## 2019-03-06 NOTE — TELEPHONE ENCOUNTER
If the pain improved with just Tylenol then I think she can just observe. I do not think these cramps today are related to the surgery. If might be intestinal but I will examine her on her visit Monday. Thanks.

## 2019-03-11 ENCOUNTER — OFFICE VISIT (OUTPATIENT)
Dept: OBGYN CLINIC | Facility: CLINIC | Age: 44
End: 2019-03-11
Payer: COMMERCIAL

## 2019-03-11 VITALS
BODY MASS INDEX: 23.53 KG/M2 | WEIGHT: 124.63 LBS | HEIGHT: 61 IN | SYSTOLIC BLOOD PRESSURE: 108 MMHG | DIASTOLIC BLOOD PRESSURE: 74 MMHG

## 2019-03-11 DIAGNOSIS — Z09 SURGERY FOLLOW-UP: Primary | ICD-10-CM

## 2019-03-11 PROCEDURE — 99024 POSTOP FOLLOW-UP VISIT: CPT | Performed by: OBSTETRICS & GYNECOLOGY

## 2019-03-11 NOTE — PROGRESS NOTES
HPI:    Patient ID: Teddy Mendez is a 38year old female. Patient here for post-op exam.  No C/Os. Pelvic pain on Friday resolved with two tylenol. Pathology reviewed and all benign. LPS 10/2016, WNL with Negative HPV. Next pap due in October. problems. No orders of the defined types were placed in this encounter.       Meds This Visit:  Requested Prescriptions      No prescriptions requested or ordered in this encounter       Imaging & Referrals:  None       #[id number]

## 2019-04-11 ENCOUNTER — OFFICE VISIT (OUTPATIENT)
Dept: INTERNAL MEDICINE CLINIC | Facility: CLINIC | Age: 44
End: 2019-04-11
Payer: COMMERCIAL

## 2019-04-11 VITALS
OXYGEN SATURATION: 98 % | HEART RATE: 80 BPM | WEIGHT: 131 LBS | SYSTOLIC BLOOD PRESSURE: 116 MMHG | BODY MASS INDEX: 24.73 KG/M2 | DIASTOLIC BLOOD PRESSURE: 64 MMHG | HEIGHT: 61 IN | TEMPERATURE: 99 F

## 2019-04-11 DIAGNOSIS — Z15.01 BRCA1 POSITIVE: ICD-10-CM

## 2019-04-11 DIAGNOSIS — Z00.00 PHYSICAL EXAM: ICD-10-CM

## 2019-04-11 DIAGNOSIS — K41.90 LEFT FEMORAL HERNIA WITHOUT OBSTRUCTION OR GANGRENE: Primary | ICD-10-CM

## 2019-04-11 DIAGNOSIS — Z15.09 BRCA1 POSITIVE: ICD-10-CM

## 2019-04-11 PROCEDURE — 99214 OFFICE O/P EST MOD 30 MIN: CPT | Performed by: INTERNAL MEDICINE

## 2019-04-11 PROCEDURE — 99212 OFFICE O/P EST SF 10 MIN: CPT | Performed by: INTERNAL MEDICINE

## 2019-04-11 PROCEDURE — 81002 URINALYSIS NONAUTO W/O SCOPE: CPT | Performed by: INTERNAL MEDICINE

## 2019-04-11 NOTE — PATIENT INSTRUCTIONS
1. Left femoral hernia without obstruction or gangrene  I like the idea of keeping an eye on this, avoiding the compromising situations which seem to aggravate this, and if things flare, using anti-inflammatories and the resting position with ice on the gr

## 2019-04-11 NOTE — PROGRESS NOTES
HPI:   Ross Rader is a 38year old female who presents for complains of: Patient presents with:  Abdominal Pain: About 3-4 weeks following bilateral oopharectomy, patient developed debilitating to LLQ of abdomen.  Was told by surgeon that this is not r/ this keeps recurring we may have to get you into see a surgeon, let me know  We also have the option of ultrasound in the area  But as discussed I do want you to avoid increased intra-abdominal pressure, tight fitting pants, and anything that would increas

## 2019-04-15 ENCOUNTER — TELEPHONE (OUTPATIENT)
Dept: INTERNAL MEDICINE CLINIC | Facility: CLINIC | Age: 44
End: 2019-04-15

## 2019-04-15 DIAGNOSIS — K41.90 NON-RECURRENT UNILATERAL FEMORAL HERNIA WITHOUT OBSTRUCTION OR GANGRENE: Primary | ICD-10-CM

## 2019-04-15 NOTE — TELEPHONE ENCOUNTER
To Dr Naomi Fonatine to advise on patient's request for u/s order    OV Note 4/11/19:   1.  Left femoral hernia without obstruction or gangrene  I like the idea of keeping an eye on this, avoiding the compromising situations which seem to aggravate this, and if th

## 2019-04-15 NOTE — TELEPHONE ENCOUNTER
Pt. Is still having pain she would like to go ahead and get an order for an ultrasound  Pt. Discussed with Dr. Piero Rodrigues already Ph. # [phone]  Routed to clinical

## 2019-04-19 NOTE — TELEPHONE ENCOUNTER
Noted, I did order this, nursing you can call her, let her know to call and get this scheduled and we will call with results.

## 2019-04-25 ENCOUNTER — TELEPHONE (OUTPATIENT)
Dept: INTERNAL MEDICINE CLINIC | Facility: CLINIC | Age: 44
End: 2019-04-25

## 2019-04-25 DIAGNOSIS — K41.90 NON-RECURRENT UNILATERAL FEMORAL HERNIA WITHOUT OBSTRUCTION OR GANGRENE: Primary | ICD-10-CM

## 2019-04-25 NOTE — TELEPHONE ENCOUNTER
Nic Luther from [address] called  Needs US ordered for lower back changed to bilateral groin    Call back # [phone]

## 2019-05-23 ENCOUNTER — HOSPITAL ENCOUNTER (OUTPATIENT)
Dept: ULTRASOUND IMAGING | Facility: HOSPITAL | Age: 44
Discharge: HOME | End: 2019-05-23
Attending: INTERNAL MEDICINE
Payer: COMMERCIAL

## 2019-05-23 DIAGNOSIS — K41.90 NON-RECURRENT UNILATERAL FEMORAL HERNIA WITHOUT OBSTRUCTION OR GANGRENE: ICD-10-CM

## 2019-05-23 PROCEDURE — 76882 US LMTD JT/FCL EVL NVASC XTR: CPT | Performed by: INTERNAL MEDICINE

## 2019-05-31 ENCOUNTER — TELEPHONE (OUTPATIENT)
Dept: INTERNAL MEDICINE CLINIC | Facility: CLINIC | Age: 44
End: 2019-05-31

## 2019-05-31 DIAGNOSIS — K41.90 NON-RECURRENT UNILATERAL FEMORAL HERNIA WITHOUT OBSTRUCTION OR GANGRENE: Primary | ICD-10-CM

## 2019-05-31 NOTE — TELEPHONE ENCOUNTER
Nursing, can you please call this patient let her know that I did send her a my chart message, and based on the ultrasound, she probably should see a surgeon to have this evaluated, I have placed an order for him/referral, hears his name and number below.

## 2020-02-28 ENCOUNTER — OFFICE VISIT (OUTPATIENT)
Dept: INTERNAL MEDICINE CLINIC | Facility: CLINIC | Age: 45
End: 2020-02-28
Payer: COMMERCIAL

## 2020-02-28 VITALS
RESPIRATION RATE: 16 BRPM | TEMPERATURE: 99 F | WEIGHT: 135 LBS | SYSTOLIC BLOOD PRESSURE: 116 MMHG | DIASTOLIC BLOOD PRESSURE: 70 MMHG | BODY MASS INDEX: 25.49 KG/M2 | OXYGEN SATURATION: 99 % | HEIGHT: 61 IN | HEART RATE: 69 BPM

## 2020-02-28 DIAGNOSIS — M25.461 EFFUSION OF RIGHT KNEE: ICD-10-CM

## 2020-02-28 DIAGNOSIS — Z84.81 FHX: BRCA1 GENE POSITIVE: ICD-10-CM

## 2020-02-28 DIAGNOSIS — Z98.84 H/O GASTRIC BYPASS: ICD-10-CM

## 2020-02-28 DIAGNOSIS — Z80.3 FAMILY HISTORY OF MALIGNANT NEOPLASM OF BREAST: ICD-10-CM

## 2020-02-28 DIAGNOSIS — Z00.00 PHYSICAL EXAM: Primary | ICD-10-CM

## 2020-02-28 DIAGNOSIS — Z90.13 ABSENCE OF BREAST, ACQUIRED, BILATERAL: ICD-10-CM

## 2020-02-28 DIAGNOSIS — M22.8X1 PATELLAR MALTRACKING, RIGHT: ICD-10-CM

## 2020-02-28 DIAGNOSIS — Z15.09 BRCA1 POSITIVE: ICD-10-CM

## 2020-02-28 DIAGNOSIS — Z15.01 BRCA1 POSITIVE: ICD-10-CM

## 2020-02-28 DIAGNOSIS — Z80.41 FAMILY HISTORY OF MALIGNANT NEOPLASM OF OVARY: ICD-10-CM

## 2020-02-28 DIAGNOSIS — L63.9 ALOPECIA AREATA: ICD-10-CM

## 2020-02-28 PROBLEM — Z98.890 H/O LEEP: Status: RESOLVED | Noted: 2017-04-03 | Resolved: 2020-02-28

## 2020-02-28 PROCEDURE — 99396 PREV VISIT EST AGE 40-64: CPT | Performed by: INTERNAL MEDICINE

## 2020-02-28 RX ORDER — OMEGA-3-ACID ETHYL ESTERS 1 G/1
1 CAPSULE, LIQUID FILLED ORAL DAILY
COMMUNITY

## 2020-02-28 NOTE — PATIENT INSTRUCTIONS
1. Physical exam  Physical exam instruction: Improve diet and exercise, complete fasting labs in the near future and you will be called with results 5-7 days after completed, call with questions. - CBC WITH DIFFERENTIAL WITH PLATELET;  Future  - COMP METAB look this up on the Internet as well

## 2020-02-28 NOTE — PROGRESS NOTES
HPI:   Nic Norton is a 41year old female who presents for a complete physical exam.     Patient complains of: Patient presents with:  Knee Pain: Pt c/o bilateral knee pain since 2008.  dr Allyson Woodward saw her, started getting cortisone shot and gel 2 yea LAPAROTOMY SALPINGO OOPHORECTOMY N/A 1/30/2019    Performed by Lacho Rome MD at [address] [place] [address]   • MASTECTOMY LEFT     • MASTECTOMY RIGHT     • MICRO FLAP Bilateral 4/9/2018    Performed by Epifanio Sanchez MD at [facility] OR   • MICRO FLAP Bilateral 4/9 Negative Chest pain and irregular heartbeat/palpitations. GI: Negative for Abdominal pain, constipation, diarrhea, heartburn, nausea and vomiting. : Negative for Dysuria and urinary frequency.   Endocrine: Negative for Cold intolerance and heat intolera REFLEX TO FREE T4; Future  - URINALYSIS WITH CULTURE REFLEX  - VITAMIN D, 25-HYDROXY; Future  - LIPID PANEL; Future  - URIC ACID, SERUM; Future    2.  Effusion of right knee  For the knee, I do think you have a patellar maltracking problem, I like the idea

## 2020-08-14 ENCOUNTER — HOSPITAL ENCOUNTER (OUTPATIENT)
Dept: MRI IMAGING | Facility: HOSPITAL | Age: 45
Discharge: HOME | End: 2020-08-14
Attending: INTERNAL MEDICINE

## 2020-08-14 DIAGNOSIS — M25.461 EFFUSION OF RIGHT KNEE: ICD-10-CM

## 2020-08-14 PROCEDURE — 73721 MRI JNT OF LWR EXTRE W/O DYE: CPT | Performed by: INTERNAL MEDICINE

## 2020-08-18 ENCOUNTER — TELEPHONE (OUTPATIENT)
Dept: INTERNAL MEDICINE CLINIC | Facility: CLINIC | Age: 45
End: 2020-08-18

## 2020-08-18 NOTE — TELEPHONE ENCOUNTER
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   Make sure she has one in mind, if not us

## 2020-09-10 ENCOUNTER — OFFICE VISIT (OUTPATIENT)
Dept: DERMATOLOGY CLINIC | Facility: CLINIC | Age: 45
End: 2020-09-10

## 2020-09-10 DIAGNOSIS — L63.9 ALOPECIA AREATA: Primary | ICD-10-CM

## 2020-09-10 PROCEDURE — 99213 OFFICE O/P EST LOW 20 MIN: CPT | Performed by: DERMATOLOGY

## 2020-09-10 RX ORDER — CLOBETASOL PROPIONATE 0.46 MG/ML
SOLUTION TOPICAL
Qty: 50 ML | Refills: 6 | Status: SHIPPED | OUTPATIENT
Start: 2020-09-10

## 2020-09-10 NOTE — PATIENT INSTRUCTIONS
B. vitamin supplementation--b complex vitamin   biotin to 10 mg daily,   vitamin D3 2000 units daily,   adequate protein intake (40 to 80 g daily)  use of volumizing shampoos and antidandruff shampoos as appropriate.    Use of minoxidil 5% foam twice daily

## 2020-09-13 NOTE — PROGRESS NOTES
Marti Liz is a 41year old female.     Patient presents with:  Hair/Scalp Problem: LOV 5/17/2017, Per patient has been having hair loss on and off since 2017, has patches of lost hair all over scalp which has worsened since March 2020, hair is fallin BIOTIN FORTE) 0.8 MG Oral Tab Take by mouth. • Clobetasol Propionate 0.05 % External Solution Use bid  As directed 50 mL 6   • Omega-3-acid Ethyl Esters 1 g Oral Cap Take 1 g by mouth daily.      • ferrous sulfate 325 (65 FE) MG Oral Tab EC Take 325 mg MD DMITRY at [address] [place] [address] OR   • OOPHORECTOMY Bilateral 01/2019   • OTHER SURGICAL HISTORY  1999    boil surgery     Social History    Socioeconomic History      Marital status:       Spouse name: Not on file      Number of children: Not on file      Years on a farm: Not Asked        History of tanning: Not Asked        Outdoor occupation: Not Asked        Breast feeding: Not Asked        Reaction to local anesthetic: No    Social History Narrative      Not on file    Family History   Problem Relation Age of noted in current visit. Patient with longstanding history of alopecia areata and intermittent patches since 2017 has tried to manage most recently with various vitamins shampoos change in diet without much change. Multiple patches.   Patient under fami thyroid functions if not improving. Fairly diffuse widespread lesions would not recommend intralesional's at this time consider if more isolated lesions. Discussed possible systemic agent such as Plaquenil would not recommend again at this time.   May use

## 2021-05-10 ENCOUNTER — TELEPHONE (OUTPATIENT)
Dept: INTERNAL MEDICINE CLINIC | Facility: CLINIC | Age: 46
End: 2021-05-10

## 2021-05-10 NOTE — TELEPHONE ENCOUNTER
Nursing tell her she will need to expedite the blowing out of the mucous plug in that area by using warm compress to the eye every 2 to 3 minutes tonight for at least 30 to 45 minutes is warm she can take it with the sink water constantly applied on a rag

## 2021-05-10 NOTE — TELEPHONE ENCOUNTER
Spoke to pt and advised on MD message below; pt verbalized understanding. Pt will call tomorrow if no improvement or worsening symptoms.

## 2021-05-10 NOTE — TELEPHONE ENCOUNTER
Patient calling for eye drops for sty in left eye. Upper lid, swollen, has formed into pimple at the edge of eye lash. Painful and obstructing her view. Has had for week. Iin the past, Dr. Augustus Montoya has given her drops.     [facility] [employer] call ba

## 2021-05-10 NOTE — TELEPHONE ENCOUNTER
Spoke to patient, has had L eye discomfort/irritation last couple of days. Confirms symptoms below. No changes to her vision, no discoloration to sclera of eye. Does have a crust that forms over her eye overnight.      To Dr. Jayashree Palomo to please advise

## 2022-11-07 NOTE — TELEPHONE ENCOUNTER
09/8/17 pt was informed about (2hr gtt) blood work that is pending for her. Pt stayed she schedule he marysol. For this Sunday. Spironolactone Counseling: Patient advised regarding risks of diarrhea, abdominal pain, hyperkalemia, birth defects (for female patients), liver toxicity and renal toxicity. The patient may need blood work to monitor liver and kidney function and potassium levels while on therapy. The patient verbalized understanding of the proper use and possible adverse effects of spironolactone.  All of the patient's questions and concerns were addressed.

## 2022-12-02 ENCOUNTER — OFFICE VISIT (OUTPATIENT)
Dept: INTERNAL MEDICINE CLINIC | Facility: CLINIC | Age: 47
End: 2022-12-02
Payer: COMMERCIAL

## 2022-12-02 VITALS
DIASTOLIC BLOOD PRESSURE: 84 MMHG | OXYGEN SATURATION: 98 % | BODY MASS INDEX: 24.99 KG/M2 | TEMPERATURE: 98 F | HEART RATE: 66 BPM | SYSTOLIC BLOOD PRESSURE: 114 MMHG | HEIGHT: 61 IN | WEIGHT: 132.38 LBS

## 2022-12-02 DIAGNOSIS — Z84.81 FHX: BRCA1 GENE POSITIVE: ICD-10-CM

## 2022-12-02 DIAGNOSIS — L63.9 ALOPECIA AREATA: ICD-10-CM

## 2022-12-02 DIAGNOSIS — Z00.00 PHYSICAL EXAM: Primary | ICD-10-CM

## 2022-12-02 DIAGNOSIS — Z80.3 FAMILY HISTORY OF MALIGNANT NEOPLASM OF BREAST: ICD-10-CM

## 2022-12-02 DIAGNOSIS — Z15.01 BRCA1 POSITIVE: ICD-10-CM

## 2022-12-02 DIAGNOSIS — Z80.41 FAMILY HISTORY OF MALIGNANT NEOPLASM OF OVARY: ICD-10-CM

## 2022-12-02 DIAGNOSIS — Z90.13 ABSENCE OF BREAST, ACQUIRED, BILATERAL: ICD-10-CM

## 2022-12-02 DIAGNOSIS — M17.0 PRIMARY OSTEOARTHRITIS OF BOTH KNEES: ICD-10-CM

## 2022-12-02 DIAGNOSIS — Z15.09 BRCA1 POSITIVE: ICD-10-CM

## 2022-12-02 DIAGNOSIS — Z98.84 H/O GASTRIC BYPASS: ICD-10-CM

## 2022-12-02 DIAGNOSIS — M22.8X1 PATELLAR MALTRACKING, RIGHT: ICD-10-CM

## 2022-12-02 PROCEDURE — 3008F BODY MASS INDEX DOCD: CPT | Performed by: INTERNAL MEDICINE

## 2022-12-02 PROCEDURE — 3079F DIAST BP 80-89 MM HG: CPT | Performed by: INTERNAL MEDICINE

## 2022-12-02 PROCEDURE — 3074F SYST BP LT 130 MM HG: CPT | Performed by: INTERNAL MEDICINE

## 2022-12-02 PROCEDURE — 99396 PREV VISIT EST AGE 40-64: CPT | Performed by: INTERNAL MEDICINE

## 2022-12-02 NOTE — PATIENT INSTRUCTIONS
1. Physical exam  Physical exam instruction: Improve diet and exercise, complete fasting labs in the near future and you will be called with results 5-7 days after completed, call with questions. Call the central scheduling number at [phone] to schedule at any of the [facility] locations    - CBC WITH DIFFERENTIAL WITH PLATELET; Future  - COMP METABOLIC PANEL (14); Future  - LIPID PANEL; Future  - TSH W REFLEX TO FREE T4; Future  - URINALYSIS WITH CULTURE REFLEX  - VITAMIN D, 25-HYDROXY; Future  - URIC ACID; Future    2. H/O gastric bypass  Stable cont monitoring and management    3. Patellar maltracking, right  Stable cont monitoring and management  Like the idea of getting an orthopedic surgeon here, due to some of the flares of the swelling, we may need on her next cortisone injection a sample of fluid for the orthopedic surgeon to send to the lab to see if we have any problems with gout or pseudogout, I will do the lab work additions on the next time you complete the lab work for the blood work component. 4. Absence of breast, acquired, bilateral  Stable continue current monitoring management, lets follow-up with Dr. Naomi Santamaria    5. BRCA1 positive  Stable continue current monitoring management    6. Alopecia areata  Stable continue current monitoring management    7. FHx: BRCA1 gene positive  Stable continue current monitoring management    8. Family history of malignant neoplasm of ovary  Stable continue current monitoring management    9. Family history of malignant neoplasm of breast  Stable continue current monitoring management    10.  Primary osteoarthritis of both knees  Stable continue current monitoring management  - ORTHOPEDIC - INTERNAL

## 2022-12-30 ENCOUNTER — TELEPHONE (OUTPATIENT)
Dept: INTERNAL MEDICINE CLINIC | Facility: CLINIC | Age: 47
End: 2022-12-30

## 2022-12-30 NOTE — TELEPHONE ENCOUNTER
refaxed Cologuard order with face sheet - confirmation received.  Called patient and relayed this message , also gave her # for [facility]

## 2023-04-24 ENCOUNTER — OFFICE VISIT (OUTPATIENT)
Dept: PHYSICAL MEDICINE AND REHAB | Facility: CLINIC | Age: 48
End: 2023-04-24
Payer: COMMERCIAL

## 2023-04-24 VITALS
WEIGHT: 132 LBS | OXYGEN SATURATION: 99 % | BODY MASS INDEX: 24.92 KG/M2 | HEART RATE: 92 BPM | HEIGHT: 61 IN | DIASTOLIC BLOOD PRESSURE: 76 MMHG | SYSTOLIC BLOOD PRESSURE: 112 MMHG

## 2023-04-24 DIAGNOSIS — M71.21 BAKER CYST, RIGHT: ICD-10-CM

## 2023-04-24 DIAGNOSIS — M22.2X9 PATELLOFEMORAL PAIN SYNDROME, UNSPECIFIED LATERALITY: Primary | ICD-10-CM

## 2023-04-24 DIAGNOSIS — M25.462 EFFUSION OF BOTH KNEE JOINTS: ICD-10-CM

## 2023-04-24 DIAGNOSIS — M25.461 EFFUSION OF BOTH KNEE JOINTS: ICD-10-CM

## 2023-04-24 DIAGNOSIS — M17.10 PRIMARY OSTEOARTHRITIS OF KNEE, UNSPECIFIED LATERALITY: ICD-10-CM

## 2023-04-24 NOTE — PATIENT INSTRUCTIONS
1) Please get X-rays of the bilateral knee today on your way out. 2)Please call and schedule your MRI at [phone]. Once you have your MRI scheduled, then call my office again to schedule a follow-up visit soon after your MRI so we may review the images together. 3) Please begin physical therapy as soon as possible. Look into [facility], [facility],   4) Tylenol 500-1000 mg every 6-8 hours as needed for pain. No more than 3000 mg daily. 5)  Ice 20 minutes at a time 3-4 times per day  6) Use open patella knee sleeve to control swelling. 7) Follow up with me in about 6 weeks. If symptoms persist, then would recommend bilateral knee HA and CSI with possible baker cyst aspiration depending on what MRI's show.    8) We can review the MRI's virtually

## 2023-06-01 ENCOUNTER — PATIENT MESSAGE (OUTPATIENT)
Dept: INTERNAL MEDICINE CLINIC | Facility: CLINIC | Age: 48
End: 2023-06-01

## 2023-06-01 NOTE — TELEPHONE ENCOUNTER
Order located in Media tab and refaxed to Haydee Medina (fax #: [phone]). Fax confirmation received. Pt updated via [employer].

## 2023-07-20 ENCOUNTER — PATIENT MESSAGE (OUTPATIENT)
Dept: INTERNAL MEDICINE CLINIC | Facility: CLINIC | Age: 48
End: 2023-07-20

## 2023-07-20 ENCOUNTER — TELEPHONE (OUTPATIENT)
Dept: INTERNAL MEDICINE CLINIC | Facility: CLINIC | Age: 48
End: 2023-07-20

## 2023-07-20 NOTE — TELEPHONE ENCOUNTER
Spoke with res at [facility] last form received for patient was December. Provided alternate fax number to nate Barrientos from faxed confirmation received, sent to scan.     Fax # [phone] alternate fax number  [phone]

## 2023-08-07 ENCOUNTER — HOSPITAL ENCOUNTER (OUTPATIENT)
Dept: GENERAL RADIOLOGY | Age: 48
Discharge: HOME | End: 2023-08-07
Attending: PHYSICAL MEDICINE & REHABILITATION
Payer: COMMERCIAL

## 2023-08-07 DIAGNOSIS — M25.462 EFFUSION OF BOTH KNEE JOINTS: ICD-10-CM

## 2023-08-07 DIAGNOSIS — M17.10 PRIMARY OSTEOARTHRITIS OF KNEE, UNSPECIFIED LATERALITY: ICD-10-CM

## 2023-08-07 DIAGNOSIS — M25.461 EFFUSION OF BOTH KNEE JOINTS: ICD-10-CM

## 2023-08-07 DIAGNOSIS — M71.21 BAKER CYST, RIGHT: ICD-10-CM

## 2023-08-07 DIAGNOSIS — M22.2X9 PATELLOFEMORAL PAIN SYNDROME, UNSPECIFIED LATERALITY: ICD-10-CM

## 2023-08-07 PROCEDURE — 73565 X-RAY EXAM OF KNEES: CPT | Performed by: PHYSICAL MEDICINE & REHABILITATION

## 2024-11-22 ENCOUNTER — PATIENT MESSAGE (OUTPATIENT)
Dept: INTERNAL MEDICINE CLINIC | Facility: CLINIC | Age: 49
End: 2024-11-22

## 2024-11-22 NOTE — TELEPHONE ENCOUNTER
Negative Cologuard results dated 11/10/24 received. Results in Care Everywhere. Care gap updated

## 2024-11-22 NOTE — TELEPHONE ENCOUNTER
I spoke to Keiko with [facility]  She relayed to me that patient's result was negative  She will fax the results over now    To Dr DEAN to please advise---

## 2024-12-10 NOTE — TELEPHONE ENCOUNTER
Nursing I think the negative Cologuard results relayed to the patient already, you can check notes if not, the usual statement is below    Noted nursing can you call patient, let him know the cologard test was negative, this is good news but this test does not replace the colonoscopy, he should still consider getting the full colonoscopy done. Otherwise followup with me as last discussed.

## 2024-12-12 ENCOUNTER — PATIENT MESSAGE (OUTPATIENT)
Dept: INTERNAL MEDICINE CLINIC | Facility: CLINIC | Age: 49
End: 2024-12-12

## 2024-12-12 NOTE — TELEPHONE ENCOUNTER
Per MD note in Nov:  \"  Nursing I think the negative Cologuard results relayed to the patient already, you can check notes if not, the usual statement is below     Noted nursing can you call patient, let him know the cologard test was negative, this is good news but this test does not replace the colonoscopy, he should still consider getting the full colonoscopy done. Otherwise followup with me as last discussed.      \"    Sent to pt

## 2025-05-07 ENCOUNTER — OFFICE VISIT (OUTPATIENT)
Facility: CLINIC | Age: 50
End: 2025-05-07

## 2025-05-07 ENCOUNTER — TELEPHONE (OUTPATIENT)
Facility: CLINIC | Age: 50
End: 2025-05-07

## 2025-05-07 VITALS
BODY MASS INDEX: 27.19 KG/M2 | SYSTOLIC BLOOD PRESSURE: 146 MMHG | HEART RATE: 72 BPM | WEIGHT: 144 LBS | DIASTOLIC BLOOD PRESSURE: 86 MMHG | HEIGHT: 61 IN

## 2025-05-07 DIAGNOSIS — Z80.0 FAMILY HISTORY OF COLON CANCER: ICD-10-CM

## 2025-05-07 DIAGNOSIS — Z12.11 COLON CANCER SCREENING: Primary | ICD-10-CM

## 2025-05-07 PROCEDURE — S0285 CNSLT BEFORE SCREEN COLONOSC: HCPCS | Performed by: INTERNAL MEDICINE

## 2025-05-07 RX ORDER — SODIUM, POTASSIUM,MAG SULFATES 17.5-3.13G
SOLUTION, RECONSTITUTED, ORAL ORAL
Qty: 1 EACH | Refills: 0 | Status: SHIPPED | OUTPATIENT
Start: 2025-05-07
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
## 2025-05-07 NOTE — TELEPHONE ENCOUNTER
Scheduled for:  Colonoscopy 40009  Provider Name:  Dr. Maddox  Date:  8/25/2025  Location:   [facility]  Sedation:  MAC  Time:  9:55 AM - Patient is aware EM will call the day before with arrival time.  Prep:  Suprep  Meds/Allergies Reconciled?:  Physician reviewed   Diagnosis with codes:  Colon cancer screening Z12.11; Fam Hx: Colon cancer Z80.0  Was patient informed to call insurance with codes (Y/N):  Yes, I confirmed [employer] insurance with the patient.   Referral sent?:  Referral was sent at the time of electronic surgical scheduling.   EM or [facility] notified?:  I sent an electronic request to Endo Scheduling and received a confirmation today.   Medication Orders: Hold multivitamins/supplements for two weeks prior to procedure.  Misc Orders:  N/A     Further instructions given by staff:  I discussed the prep instructions with the patient which she verbally understood and is aware that I will send the instructions today.

## 2025-05-07 NOTE — PATIENT INSTRUCTIONS
Colon cancer screening  Family history of colon cancer  - colonoscopy with suprep or Golytely prep  - MAC sedation

## 2025-05-07 NOTE — TELEPHONE ENCOUNTER
Patient was seen in office today (5/7/2025) by Dr Maddox. Provided patient with office number and prep instructions. Reviewed prep instructions with patient in office, verbalized understanding. Patient aware GI schedulers will call patient to schedule the procedure.      Procedure orders:    Schedule: Colonoscopy   Diagnosis:     ICD-10-CM   1. Colon cancer screening  Z12.11   2. Family history of colon cancer  Z80.0      Sedation: MAC   Prep: Split Suprep     Medication changes prior to procedure:   None

## 2025-08-25 ENCOUNTER — HOSPITAL ENCOUNTER (OUTPATIENT)
Facility: HOSPITAL | Age: 50
Setting detail: HOSPITAL OUTPATIENT SURGERY
Discharge: HOME | End: 2025-08-25
Attending: INTERNAL MEDICINE | Admitting: INTERNAL MEDICINE

## 2025-08-25 ENCOUNTER — ANESTHESIA EVENT (OUTPATIENT)
Dept: ENDOSCOPY | Facility: HOSPITAL | Age: 50
End: 2025-08-25

## 2025-08-25 ENCOUNTER — ANESTHESIA (OUTPATIENT)
Dept: ENDOSCOPY | Facility: HOSPITAL | Age: 50
End: 2025-08-25

## 2025-08-25 VITALS
WEIGHT: 145 LBS | HEIGHT: 61 IN | DIASTOLIC BLOOD PRESSURE: 74 MMHG | RESPIRATION RATE: 14 BRPM | HEART RATE: 73 BPM | SYSTOLIC BLOOD PRESSURE: 105 MMHG | BODY MASS INDEX: 27.38 KG/M2 | OXYGEN SATURATION: 98 %

## 2025-08-25 DIAGNOSIS — Z80.0 FAMILY HISTORY OF COLON CANCER: ICD-10-CM

## 2025-08-25 DIAGNOSIS — Z12.11 COLON CANCER SCREENING: ICD-10-CM

## 2025-08-25 PROCEDURE — 45380 COLONOSCOPY AND BIOPSY: CPT | Performed by: INTERNAL MEDICINE

## 2025-08-25 RX ORDER — SODIUM CHLORIDE, SODIUM LACTATE, POTASSIUM CHLORIDE, CALCIUM CHLORIDE 600; 310; 30; 20 MG/100ML; MG/100ML; MG/100ML; MG/100ML
INJECTION, SOLUTION INTRAVENOUS CONTINUOUS
Status: DISCONTINUED | OUTPATIENT
Start: 2025-08-25 | End: 2025-08-25

## 2025-08-25 RX ORDER — LIDOCAINE HYDROCHLORIDE 10 MG/ML
INJECTION, SOLUTION EPIDURAL; INFILTRATION; INTRACAUDAL; PERINEURAL AS NEEDED
Status: DISCONTINUED | OUTPATIENT
Start: 2025-08-25 | End: 2025-08-25 | Stop reason: SURG

## 2025-08-25 RX ORDER — NALOXONE HYDROCHLORIDE 0.4 MG/ML
0.08 INJECTION, SOLUTION INTRAMUSCULAR; INTRAVENOUS; SUBCUTANEOUS ONCE AS NEEDED
Status: DISCONTINUED | OUTPATIENT
Start: 2025-08-25 | End: 2025-08-25

## 2025-08-25 RX ORDER — ATROPINE SULFATE 1 MG/ML
INJECTION, SOLUTION INTRAMUSCULAR; INTRAVENOUS; SUBCUTANEOUS AS NEEDED
Status: DISCONTINUED | OUTPATIENT
Start: 2025-08-25 | End: 2025-08-25 | Stop reason: SURG

## 2025-08-25 RX ADMIN — LIDOCAINE HYDROCHLORIDE 50 MG: 10 INJECTION, SOLUTION EPIDURAL; INFILTRATION; INTRACAUDAL; PERINEURAL at 08:53:00

## 2025-08-25 RX ADMIN — SODIUM CHLORIDE, SODIUM LACTATE, POTASSIUM CHLORIDE, CALCIUM CHLORIDE: 600; 310; 30; 20 INJECTION, SOLUTION INTRAVENOUS at 08:50:00

## 2025-08-25 RX ADMIN — ATROPINE SULFATE 0.5 MG: 1 INJECTION, SOLUTION INTRAMUSCULAR; INTRAVENOUS; SUBCUTANEOUS at 08:51:00

## 2025-08-26 ENCOUNTER — TELEPHONE (OUTPATIENT)
Facility: CLINIC | Age: 50
End: 2025-08-26

## (undated) DEVICE — SUTURE VICRYL 1 CT-1

## (undated) DEVICE — GEL AQUASONIC 100 20GR

## (undated) DEVICE — EYE SPEARS: Brand: WECK-CEL

## (undated) DEVICE — STERILE LATEX POWDER-FREE SURGICAL GLOVESWITH NITRILE COATING: Brand: PROTEXIS

## (undated) DEVICE — INTENDED FOR TISSUE SEPARATION, AND OTHER PROCEDURES THAT REQUIRE A SHARP SURGICAL BLADE TO PUNCTURE OR CUT.: Brand: BARD-PARKER ® STAINLESS STEEL BLADES

## (undated) DEVICE — TOWEL OR WHITE STRL

## (undated) DEVICE — BLADE 11 SHRP BP SS SRG STRL

## (undated) DEVICE — DERMABOND LIQUID ADHESIVE

## (undated) DEVICE — GAMMEX® PI HYBRID SIZE 8.5, STERILE POWDER-FREE SURGICAL GLOVE, POLYISOPRENE AND NEOPRENE BLEND: Brand: GAMMEX

## (undated) DEVICE — SUTURE PDS II 4-0 PS-2

## (undated) DEVICE — VIOLET BRAIDED (POLYGLACTIN 910), SYNTHETIC ABSORBABLE SUTURE: Brand: COATED VICRYL

## (undated) DEVICE — ABDOMINAL PAD: Brand: CURITY

## (undated) DEVICE — 1010 S-DRAPE TOWEL DRAPE 10/BX: Brand: STERI-DRAPE™

## (undated) DEVICE — UNDYED BRAIDED (POLYGLACTIN 910), SYNTHETIC ABSORBABLE SUTURE: Brand: COATED VICRYL

## (undated) DEVICE — LAPAROTOMY: Brand: MEDLINE INDUSTRIES, INC.

## (undated) DEVICE — LARGE, DISPOSABLE ALEXIS O C-SECTION PROTECTOR - RETRACTOR: Brand: ALEXIS ® O C-SECTION PROTECTOR - RETRACTOR

## (undated) DEVICE — 3M™ TEGADERM™ TRANSPARENT FILM DRESSING, 1626W, 4 IN X 4-3/4 IN (10 CM X 12 CM), 50 EACH/CARTON, 4 CARTON/CASE: Brand: 3M™ TEGADERM™

## (undated) DEVICE — CLIPPER BLADE 3M

## (undated) DEVICE — BANDAGE ROLL,100% COTTON, 6 PLY, LARGE: Brand: KERLIX

## (undated) DEVICE — CLIP MED INTNL HMCLP TNTLM

## (undated) DEVICE — RUBBER BAND STRL: Type: IMPLANTABLE DEVICE

## (undated) DEVICE — TUBING SCT CLR 6FT .25IN MDVC

## (undated) DEVICE — INSULATED BLADE ELECTRODE 6.5

## (undated) DEVICE — COVER STND 54X23IN MAYO REINF

## (undated) DEVICE — DRAPE TAPE: Brand: CONVERTORS

## (undated) DEVICE — STERILE POLYISOPRENE POWDER-FREE SURGICAL GLOVES: Brand: PROTEXIS

## (undated) DEVICE — 3 ML SYRINGE LUER-LOCK TIP: Brand: MONOJECT

## (undated) DEVICE — DRAPE SHEET LG

## (undated) DEVICE — TRAY CATH BDX 16FR 350ML FL

## (undated) DEVICE — SUTURE VICRYL 0 J340H

## (undated) DEVICE — MINOR GENERAL: Brand: MEDLINE INDUSTRIES, INC.

## (undated) DEVICE — 9534HP TRANSPARENT DRSG W/FRAME: Brand: 3M™ TEGADERM™

## (undated) DEVICE — SUTURE VICRYL 0 CTX

## (undated) DEVICE — 6 ML SYRINGE LUER-LOCK TIP: Brand: MONOJECT

## (undated) DEVICE — GOWN SURG AERO BLUE PERF LG

## (undated) DEVICE — CLIP SM INTNL HMCLP TNTLM ESCP

## (undated) DEVICE — WIPE WITH WICK AND CORNEAL SHIELD: Brand: MEROCEL

## (undated) DEVICE — SUTURE PROLENE 4-0 RB-1

## (undated) DEVICE — TRAY CATH BDX IC 14FR 2L FL

## (undated) DEVICE — TRAY FOLEY BDX 16F STATLOCK

## (undated) DEVICE — Device: Brand: JELCO

## (undated) DEVICE — REM POLYHESIVE ADULT PATIENT RETURN ELECTRODE: Brand: VALLEYLAB

## (undated) DEVICE — CAUTERY BLADE 2IN INS E1455

## (undated) DEVICE — CONTAINER SPEC STR 4OZ GRY LID

## (undated) DEVICE — SUTURE VICRYL 3-0 SH

## (undated) DEVICE — SUTURE VICRYL 0 CT-1

## (undated) DEVICE — SPONGE LAP 18X18 XRAY STRL

## (undated) DEVICE — 3M™ IOBAN™ 2 ANTIMICROBIAL INCISE DRAPE 6650EZ: Brand: IOBAN™ 2

## (undated) DEVICE — DRAPE PACK CHEST & U BAR

## (undated) DEVICE — FLEXIBLE YANKAUER,MEDIUM TIP, NO VACUUM CONTROL: Brand: ARGYLE

## (undated) DEVICE — POUCH: SSEAL TYVEK 2000/CS: Brand: MEDICAL ACTION INDUSTRIES

## (undated) DEVICE — SYRINGE 10ML LL CONTRL SYRINGE

## (undated) DEVICE — SUCTION CANISTER, 3000CC,SAFELINER: Brand: DEROYAL

## (undated) DEVICE — PEN: MARKING STD PT 100/CS: Brand: MEDICAL ACTION INDUSTRIES

## (undated) DEVICE — C SECTION PACK: Brand: MEDLINE INDUSTRIES, INC.

## (undated) DEVICE — PROXIMATE RH ROTATING HEAD SKIN STAPLERS (35 WIDE) CONTAINS 35 STAINLESS STEEL STAPLES: Brand: PROXIMATE

## (undated) DEVICE — WOUND RETRACTOR AND PROTECTOR: Brand: ALEXIS O WOUND PROTECTOR-RETRACTOR

## (undated) DEVICE — GAUZE SPONGES: Brand: DEROYAL

## (undated) DEVICE — RUBBER BANDS

## (undated) DEVICE — SUTURE ETHIBOND EXCEL 0 CT-2

## (undated) DEVICE — TOWEL OR BLU 16X26 STRL

## (undated) DEVICE — SUTURE PROLENE 1 CT-1

## (undated) DEVICE — PROXIMATE SKIN STAPLERS (35 WIDE) CONTAINS 35 STAINLESS STEEL STAPLES (FIXED HEAD): Brand: PROXIMATE

## (undated) DEVICE — LIGACLIP MCA MULTIPLE CLIP APPLIERS, 20 MEDIUM CLIPS: Brand: LIGACLIP

## (undated) DEVICE — DRAPE SLUSH/WARMER W/DISC

## (undated) DEVICE — COVER SLV UNV DISP NTR STRL LF

## (undated) DEVICE — CATH IV 24G .75IN WNG YLW

## (undated) DEVICE — RETRACTOR LONE STAR STAYS BLNT

## (undated) DEVICE — SUTURE PROLENE 8-0 BV-130-5

## (undated) DEVICE — CLIP SM INTNL HMCLP TI ESCP

## (undated) DEVICE — DRAPE CASSETTE X-RAY

## (undated) DEVICE — VIOPTIX DISPOSABLE SENSOR

## (undated) DEVICE — DRAIN RESERVOIR RELIAVAC 100CC

## (undated) DEVICE — SUTURE ETHILON 3-0 669H

## (undated) DEVICE — LIGACLIP MCA MULTIPLE CLIP APPLIERS, 20 SMALL CLIPS: Brand: LIGACLIP

## (undated) DEVICE — MEDI-VAC NON-CONDUCTIVE SUCTION TUBING: Brand: CARDINAL HEALTH

## (undated) DEVICE — GOWN SURG AERO BLUE PERF XLG

## (undated) DEVICE — SOL  .9 1000ML BTL

## (undated) DEVICE — Device

## (undated) DEVICE — TRAY SRGPRP PVP IOD WT SCRB SM

## (undated) DEVICE — SUTURE SILK 2-0 685H

## (undated) DEVICE — 3M™ STERI-DRAPE™ INSTRUMENT POUCH 1018: Brand: STERI-DRAPE™

## (undated) DEVICE — CHLORAPREP 26ML APPLICATOR

## (undated) DEVICE — DRAPE 82X48IN MSCP MSCP OPM

## (undated) DEVICE — COVER SGL STRL LGHT HNDL BLU

## (undated) DEVICE — SUTURE VICRYL 2-0 CT-1

## (undated) DEVICE — SUTURE PROLENE 1 CTX

## (undated) DEVICE — X-RAY DETECTABLE SPONGES,16 PLY: Brand: VISTEC

## (undated) DEVICE — KENDALL SCD EXPRESS SLEEVES, KNEE LENGTH, MEDIUM: Brand: KENDALL SCD

## (undated) DEVICE — MAJOR GENERAL: Brand: MEDLINE INDUSTRIES, INC.

## (undated) DEVICE — SUTURE CHROMIC GUT 2-0 SH

## (undated) DEVICE — SUCTION SARNS MICRO SUCKER

## (undated) DEVICE — BLAKE SILICONE DRAIN, 15 FR ROUND, HUBLESS WITH 3/16" TROCAR: Brand: BLAKE

## (undated) DEVICE — CLIP HMST 30 CRTDG SPRFN BLU

## (undated) DEVICE — KIT MFLD FOR SPEC COLL

## (undated) DEVICE — KIT CLEAN ENDOKIT 1.1OZ GOWNX2

## (undated) DEVICE — APPLIER LICACLIP MCA MED 23.8

## (undated) DEVICE — MICRO CLIP GOLD

## (undated) DEVICE — BIPOLAR FORCEPS CORD,BANANA LEADS: Brand: VALLEYLAB

## (undated) DEVICE — 3M™ BAIR HUGGER® UNDERBODY BLANKET, FULL ACCESS, 10 PER CASE 63500: Brand: BAIR HUGGER™

## (undated) DEVICE — WIPE INST 3.625X3IN MRCL MRCL

## (undated) DEVICE — DRESSING BIOPATCH 1X4 CNTR

## (undated) DEVICE — TOWEL,OR,DSP,ST,WHITE,DLX,4/PK,20PK/CS: Brand: MEDLINE

## (undated) DEVICE — NON-ADHERENT PAD PREPACK: Brand: TELFA

## (undated) DEVICE — PENCIL ESURG 10FT 3/32IN SS

## (undated) DEVICE — 3M™ STERI-STRIP™ REINFORCED ADHESIVE SKIN CLOSURES, R1547, 1/2 IN X 4 IN (12 MM X 100 MM), 6 STRIPS/ENVELOPE: Brand: 3M™ STERI-STRIP™

## (undated) DEVICE — SUTURE PLAIN GUT 3-0 CT-1

## (undated) DEVICE — KIT ENDO ORCAPOD 160/180/190

## (undated) NOTE — MR AVS SNAPSHOT
[facility]  [address] [place] [address], [address] [place] [address]                    After Visit Summary   3/24/2017    Gilmar Daly    MRN: [id number]           Visit Information        Provider Department Dept Phone    3/24/2 4/3/2017 9:30 AM Estella Galindo MD [facility], [facility], [facility], [address] [place] [address]    4/7/2017 11:00 AM Tanya Bledsoe MD [facility] [phone]    4/14/2017 12:30 PM [place], [place], [address]

## (undated) NOTE — LETTER
[address], [place], [facility]  1. Por medio de la presente autorizo al (a).  Kun Hartley MD and , Dr Sami Terrazas MD  [facility] y valentine(s) Asistente(s), a llevar procedimiento pueden ser [place] por, y a discreción de [facility].  8. Entiendo que el(la) doctor(a) y shani médicos asistentes no son empleados o agentes del hospital, sino profesionales médicos independientes a quienes el hospital ha pe le(s) he explicado los riesgos y beneficios involucradosen el rechazo del tratarniento propuesto y alternativas al tratamiento propuesto, y he respondido a las preguntas del(la) paciente(My signature below affirms that prior to the time of the procedure, I

## (undated) NOTE — LETTER
[address]  [facility], [place]  [address]  INFORMED CONSENT FOR TRANSFUSION OF BLOOD OR BLOOD PRODUCTS  My physician has informed me of the nature, purpose, benefits and risks of transfusion for blood and blood components that ______________________________________________  (Signature of Patient)                                                            (Responsible party in case of Minor,

## (undated) NOTE — LETTER
[address], [place], [address] [place] [address]  1. Por medio de la presente autorizo al (a).  Sidra Copeland MD [facility] y valentine(s) Asistente(s), a llevar a cabo la siguiente operac 8. Entiendo que el(la) doctor(a) y shani médicos asistentes no son empleados o agentes del hospital, sino profesionales médicos independientes a quienes el hospital ha permitido usar shani instalaciones para Harsh Spotted y tratamiento de shani [place].   9. Lori Black tratarniento propuesto y alternativas al tratamiento propuesto, y he respondido a las preguntas del(la) paciente(My signature below affirms that prior to the time of the procedure, I have explained to the patient and/or his/her guardian, therisks and benef

## (undated) NOTE — LETTER
Date: 5/11/2018    Patient Name: Nacho Conley          To Whom it may concern: This letter has been written at the patient's request. The above patient was seen at the [facility] for treatment of a medical condition.       The patient may

## (undated) NOTE — IP AVS SNAPSHOT
Patient Demographics     Address Phone E-mail Address    Makayla Reece [address] [phone] (Home) *Preferred*  [phone] (Mobile) [email or web address]      Emergency Contact(s)     Name Relation Home Work Mobile    Catrina through 5/24/2017  2:22 PM)     No notes of this type exist for this encounter. Occupational Therapy Notes (last 72 hours) (Notes from 5/21/2017  2:22 PM through 5/24/2017  2:22 PM)     No notes of this type exist for this encounter.       Video Daysi

## (undated) NOTE — MR AVS SNAPSHOT
[facility] SPECIALTY [facility] [place] [address] [address] [address] [phone]               Thank you for choosing us for your health care visit with Camille Mccarthy MD.  We are glad to serve you and happy to provide you with this summary of Phone:  [phone]    - Clobetasol Propionate 0.05 % Soln            MyChart     Visit [employer]  You can access your MyChart to more actively manage your health care and view more details from this visit by going to [email or web address].   If you

## (undated) NOTE — MR AVS SNAPSHOT
[place]  [facility] [address] [address]  [phone]               Thank you for choosing us for your health care visit with Freddie Peralta DO. We are glad to serve you and happy to provide you with this summary of your visit. F/u with the sugeons after baby is out    5. Family history of malignant neoplasm of ovary  F/u with the sugeons after baby is out    6. FHx: BRCA1 gene positive  F/u with the sugeons after baby is out    7. H/O gastric bypass  stable    8.  H/O  se Educational Information     Healthy Diet and Regular Exercise  [facility] [employer] [address] for making healthy food choices  -   Enjoy your food, but eat less. Fully enjoy your food when eating. Don’t eat while distracted and slow down.    Avoid

## (undated) NOTE — LETTER
Hospital Discharge Documentation  Please phone to schedule a hospital follow up appointment. No discharge summary available at this time, below is the most recent progress note  for your review .         From: [id number] Hospitalist's Office  Phone: [phone] Abdomen: Soft, nontender, nondistended. Positive bowel sounds. No rebound or guarding. Neurologic: No focal neurological deficits. Musculoskeletal: Moves all extremities.   Extremities: No edema.        Intake/Output Summary (Last 24 hours) at 04/11/18 Metoclopramide HCl           Yony Villatoro MD         Electronically signed by Jhon Paul MD at 4/11/2018 11:52 AM

## (undated) NOTE — IP AVS SNAPSHOT
Patient Demographics     Address Phone E-mail Address    Makayla Reece [address] [phone] (Home) *Preferred*  [phone] (Mobile) [email or web address]      Emergency Contact(s)     Name Relation Home Work Mobile    Catrina through 6/21/2017 12:29 PM)     No notes of this type exist for this encounter. Occupational Therapy Notes (last 72 hours) (Notes from 6/18/2017 12:29 PM through 6/21/2017 12:29 PM)     No notes of this type exist for this encounter.       Video Daysi

## (undated) NOTE — LETTER
[address] [place] [address] Rd, [place], [place]     AUTHORIZATION FOR SURGICAL OPERATION OR PROCEDURE  1.  I hereby authorize Dr. Wali Bennett MD, my Physician(s) and whomever may be designated as the doctor's Assistant, to perform the 5. I consent to the photographing of procedure(s) to be performed for the purposes of advancing medicine, science and/or education, provided my identity is not revealed.  If the procedure has been videotaped, the physician/surgeon will obtain the original v (Witness signature)                                                                                                  (Date)                                (Time)  STATEMENT OF PHYSICIAN My signature below affirms that prior to the time of the procedure;  I

## (undated) NOTE — ED AVS SNAPSHOT
Marcelo Cox   MRN: [id number]    Department:  [facility] Emergency Department   Date of Visit:  11/4/2018           Disclosure     Insurance plans vary and the physician(s) referred by the ER may not be covered by your plan.  Please contact y CARE PHYSICIAN AT ONCE OR RETURN IMMEDIATELY TO THE EMERGENCY DEPARTMENT. If you have been prescribed any medication(s), please fill your prescription right away and begin taking the medication(s) as directed.   If you believe that any of the medications

## (undated) NOTE — MR AVS SNAPSHOT
[facility]  [address] [place] [id number] [phone]               Thank you for choosing us for your health care visit with Destiny Almanza.  Franck Burroughs MD.  We are glad to serve you and happy to provide you with this summary of your visit Normal pregnancy, first trimester    -  Primary      Instructions and Information about Your Health     None      Allergies as of May 10, 2017     Penicillins                 Today's Vital Signs     BP Weight                104/60 mmHg 131 lb (59.421 kg)

## (undated) NOTE — LETTER
[facility]   Administracion de Evelin , O ___________________________________ yury Alcaraz se me                              (Representante)    administre anestesia manuel el procedimiento, operacion o presion, dificultad al orinar, dolor de rigoberto, disminucion del ritmo cardiaco del martha. Riesgos remotos incluyen: infecciones, alto bloqueo saunders, sangramiento del canal saunders, convulciones, parocardiaco y [place].   6. CONCIENCIA: Comprendo que es posib (Firma del Testigo)                                                                       ___________________________________________     ___________________________________________________

## (undated) NOTE — LETTER
[address] [place] [address], [place], [place]     AUTHORIZATION FOR SURGICAL OPERATION OR PROCEDURE  1. I hereby authorize Dr. Fortunato Seo.  Lisa ANDRES MD and Dr. Cristi Andres MD my Physician(s) and whomever may be designated as the doctor's As own blood, or a directed donor transfusion, I will discuss this with my Physician. 5. I consent to the photographing of procedure(s) to be performed for the purposes of advancing medicine, science and/or education, provided my identity is not revealed.  If _______________________________________________________________ ____________________________  (Witness signature)                                                                                                  (Date)                                (Time)

## (undated) NOTE — IP AVS SNAPSHOT
[address]  [address] [facility] [phone]                Discharge Summary   5/24/2017    [facility]           Admission Information        Provider Department    5/24/2017 Renny Noriega MD [facility] [address] [facility] [address] [phone]              Discharge Orders     Future Labs/Procedures Expected by Expires    [address] PG UTERUS TRANSVAGINAL(CPT=76817)  5/24/2017 (Approximate) 5/24/2018    Scheduling Instructions:     To schedule a test at an coverage. Patient [address] is a Federal Navigator program that can help with your Affordable Care Act coverage, as well as all types of Medicaid plans.   To get signed up and covered, please call [phone] and ask to get set up for an insuran Use: Increase blood cell counts   Most common side effects: Pain, fever, rash, fatigue, joint pain, blood clots, high blood pressure   What to report to your healthcare team: Pain, swelling, rash, fever           All Other Medications     Clobetasol Propi

## (undated) NOTE — IP AVS SNAPSHOT
[address]  [address] [facility] [phone]                Discharge Summary   6/21/2017    Nely Sparks           Admission Information        Provider Department    6/21/2017 Yanira Vasquez MD [facility] [address] [place] [address]   [phone]            Jul 18, 2017  8:00 AM   [facility] ([address])    [address]   593-4 (06/15/17)  8.6 (06/15/17)  3.52 (L) (06/15/17)  11.8 (L) (06/15/17)  33.6 (L) (06/15/17)  95.4 -- -- -- (06/15/17)  158 (06/15/17)  10.0      Recent Hematology Lab Results (cont.)  (Last 3 results in the past 90 days)    Neutrophil % Lymphocyte % Monocyt You can access your MyChart to more actively manage your health care and view more details from this visit by going to [email or web address].   If you've recently had a stay at the Hospital you can access your discharge instructions in [address] by matheus Cholecalciferol (VITAMIN D3) 400 UNITS Oral Tab

## (undated) NOTE — MR AVS SNAPSHOT
[facility]  [address] [place] [address]  [phone]               Thank you for choosing us for your health care visit with Santa Bermudez.  Gisela Michaud MD.  We are glad to serve you and happy to provide you with this summary of your visit This list is accurate as of: 4/3/17 10:03 AM.  Always use your most recent med list.                B-12 100 MCG Tabs   Take by mouth. Omega-3-acid Ethyl Esters 1 g Caps   Take 1 g by mouth daily.    Commonly known as:  Marcello Lerma

## (undated) NOTE — MR AVS SNAPSHOT
[facility]  [address] [place] [address] [phone]               Thank you for choosing us for your health care visit with Evelyn Jason MD.  We are glad to serve you and happy to provide you with this summary of your visit Your unique [employer] Access Code: [email or web address]  Expires: 4/2/2017  5:15 PM    If you have questions, you can call [phone] to talk to our [facility] Staff. Remember, [employer] is NOT to be used for urgent needs. For medical emergencies, dial 911.

## (undated) NOTE — LETTER
[facility] [address], [place], [place]  [address]  CONSENTIMIENTO INFORMADO PARA TRANSFUSIÓN DE REBECCA O PRODUCTOS DE LA REBECCA  Mi doctor me ha informado de la naturaleza, el propósito, los beneficios y los riesgos relacionados con l transfusión de Maria D Calvo y/ o componentes de la Maria D Calvo, severino y Neri lo consideren necesario y [employer] médicos a cargo del cuidado de mi alejandrina. Mis médicos me coombs dado la oportunidad de formular preguntas, mismas que coombs contestado a mi entera satisfacción. (Firma del testigo)                                                         (Prabhakar/Dagmar)       Patient Name: Marie Weldon     : 1975                 Printed: 2018      Medical Record #: [id number]

## (undated) NOTE — MR AVS SNAPSHOT
[facility]  [address] [place] [address] [phone]               Thank you for choosing us for your health care visit with Nurse. We are glad to serve you and happy to provide you with this summary of your visit.   Please help u HIV AG AB Combo    Complete by:  Apr 10, 2017 (Approximate)    Assoc Dx: Other normal pregnancy, not first, first trimester [Z34.81]           Urinalysis, Routine    Complete by:  Apr 10, 2017    Assoc Dx:   Other normal pregnancy, not first, first trimes your Zip Code and Date of Birth to complete the sign-up process. If you do not sign up before the expiration date, you must request a new code.     Your unique [employer] Access Code: [email or web address]  Expires: 6/2/2017 10:03 AM    If you have questions, you can ca

## (undated) NOTE — Clinical Note
Weekly NST's at 34 weeks. \\  Twice weekly testing at 38 weeks - weekly NST and weekly BPP. Follow-up Growth ultrasound at 32 weeks.

## (undated) NOTE — MR AVS SNAPSHOT
Beverly Quevedo   3/2/2017 1:00 PM   Office Visit   MRN:  [id number]    Description:  Female : 1975   Provider:  Antonio Vera   Department:  [facility]              Visit Summary      Primary Visit Diagnos

## (undated) NOTE — Clinical Note
Level II at 20 weeks with CL Follow-up Growth ultrasound at 32 weeks. Weekly NST's at 34 weeks. \\  Twice weekly testing at 38 weeks - weekly NST and weekly BPP.